# Patient Record
Sex: MALE | Race: WHITE | NOT HISPANIC OR LATINO | Employment: FULL TIME | URBAN - NONMETROPOLITAN AREA
[De-identification: names, ages, dates, MRNs, and addresses within clinical notes are randomized per-mention and may not be internally consistent; named-entity substitution may affect disease eponyms.]

---

## 2017-08-14 ENCOUNTER — COMMUNICATION - GICH (OUTPATIENT)
Dept: FAMILY MEDICINE | Facility: OTHER | Age: 52
End: 2017-08-14

## 2017-08-14 DIAGNOSIS — E03.9 HYPOTHYROIDISM: ICD-10-CM

## 2017-09-15 ENCOUNTER — AMBULATORY - GICH (OUTPATIENT)
Dept: SCHEDULING | Facility: OTHER | Age: 52
End: 2017-09-15

## 2017-10-10 ENCOUNTER — AMBULATORY - GICH (OUTPATIENT)
Dept: FAMILY MEDICINE | Facility: OTHER | Age: 52
End: 2017-10-10

## 2017-10-11 ENCOUNTER — COMMUNICATION - GICH (OUTPATIENT)
Dept: FAMILY MEDICINE | Facility: OTHER | Age: 52
End: 2017-10-11

## 2017-10-11 DIAGNOSIS — E03.9 HYPOTHYROIDISM: ICD-10-CM

## 2017-10-30 ENCOUNTER — OFFICE VISIT - GICH (OUTPATIENT)
Dept: FAMILY MEDICINE | Facility: OTHER | Age: 52
End: 2017-10-30

## 2017-10-30 ENCOUNTER — HISTORY (OUTPATIENT)
Dept: FAMILY MEDICINE | Facility: OTHER | Age: 52
End: 2017-10-30

## 2017-10-30 DIAGNOSIS — Z00.00 ENCOUNTER FOR GENERAL ADULT MEDICAL EXAMINATION WITHOUT ABNORMAL FINDINGS: ICD-10-CM

## 2017-10-30 DIAGNOSIS — E03.9 HYPOTHYROIDISM: ICD-10-CM

## 2017-12-12 ENCOUNTER — AMBULATORY - GICH (OUTPATIENT)
Dept: LAB | Facility: OTHER | Age: 52
End: 2017-12-12

## 2017-12-12 DIAGNOSIS — E03.9 HYPOTHYROIDISM: ICD-10-CM

## 2017-12-12 LAB — TSH - HISTORICAL: 4.44 UIU/ML (ref 0.34–5.6)

## 2017-12-28 NOTE — TELEPHONE ENCOUNTER
Patient Information     Patient Name MRN Sex Toño Diana [id number] Male 1965      Telephone Encounter by Guaadlupe King RN at 10/12/2017  1:35 PM     Author:  Guadalupe King RN Service:  (none) Author Type:  NURS- Registered Nurse     Filed:  10/12/2017  1:36 PM Encounter Date:  10/11/2017 Status:  Signed     :  Guadalupe King RN (NURS- Registered Nurse)            Hypothyroidism    Office visit in the past 12 months or per provider note.    Last visit with ARTHUR ERAZO was on: 10/13/2016 in [facility]  Next visit with ARTHUR ERAZO is on: 10/30/2017 in [facility]  Next visit with [facility] is on: 10/30/2017 in [facility]    Lab testing requirements:  TSH annually  TSH (uIU/mL)    Date Value   2016 1.89       Max refill for 12 months from last office visit or per provider note.    Patient is due for medication management appointment. Limited refill provided at this time. Upcoming appointment noted. Prescription refilled per RN Medication Refill Policy.................... Guadalupe King RN ....................  10/12/2017   1:36 PM

## 2017-12-28 NOTE — PROGRESS NOTES
Patient Information     Patient Name MRN Sex Toño Diana [id number] Male 1965      Progress Notes by Fredrick Villarreal MD at 10/30/2017  9:30 AM     Author:  Fredrick Villarreal MD Service:  (none) Author Type:  Physician     Filed:  10/30/2017 10:17 AM Encounter Date:  10/30/2017 Status:  Signed     :  Fredrick Villarreal MD (Physician)            SUBJECTIVE:    Toño Olson is a 52 y.o. male who presents for his annual physical exam.    HPI: Patient had been experiencing palpitations and slight dyspnea on exertion. Had a negative exercise stress test a year ago and subsequently decreased his caffeine intake. Has noted a significant improvement in symptoms since then. Has a history of hypothyroidism on replacement and his recent TSH through work was high at 6.59. He otherwise has been feeling well.    PROBLEM LIST:  Patient Active Problem List     Diagnosis  Code     Hypothyroidism E03.9     Health care maintenance Z00.00     DDD (degenerative disc disease), lumbar M51.36     Exercise-induced asthma J45.990     PAST MEDICAL HISTORY:  Past Medical History:     Diagnosis  Date     Normal cardiac stress test 2016     Pneumonia     Hospitalized as a child      SURGICAL HISTORY:  Past Surgical History:      Procedure  Laterality Date     COLONOSCOPY DIAGNOSTIC      F/U        TONSILLECTOMY  1969     VASECTOMY         SOCIAL HISTORY:  Social History     Social History        Marital status:       Spouse name: N/A     Number of children:  N/A     Years of education:  N/A     Occupational History      Not on file.     Social History Main Topics         Smoking status:   Never Smoker     Smokeless tobacco:   Never Used     Alcohol use   2.5 oz/week      5 Standard drinks or equivalent per week         Comment: couple beers or wine a week      Drug use:   No     Sexual activity:   Not on file     Other Topics  Concern     Seat Belt Yes     Social History Narrative       "with three sons.   at [employer].             FAMILY HISTORY:  Family History      Problem  Relation Age of Onset     Cancer-breast Mother      Atrial fibrillation Mother      Hyperlipidemia Father      Alcohol/Drug Sister      Alcoholism Sister       CURRENT MEDICATIONS:   Current Outpatient Prescriptions       Medication  Sig Dispense Refill     levothyroxine (SYNTHROID) 125 mcg tablet Take 1 tablet by mouth before breakfast. 90 tablet 0     omega 3-dha-epa-fish oil (FISH OIL) 900-1,400 mg CpDR Take 1 capsule by mouth once daily.  0     No current facility-administered medications for this visit.      Medications have been reviewed by me and are current to the best of my knowledge and ability.    ALLERGIES:  Cats (fur, dander, saliva)    REVIEW OF SYSTEMS:  General: denies any general problems.  Eyes: denies problems  Ears/Nose/Throat: denies problems  Cardiovascular: denies problems  Respiratory: denies problems  Gastrointestinal: denies problems  Genitourinary: denies problems  Musculoskeletal: denies problems  Skin: denies problems  Neurologic: denies problems  Psychiatric: denies problems  Endocrine: denies problems  Heme/Lymphatic: denies problems  Allergic/Immunologic: denies problems  PHQ Depression Screening 10/30/2017   Date of PHQ exam (doc flow) 10/30/2017   1. Lack of interest/pleasure 0 - Not at all   2. Feeling down/depressed 0 - Not at all   PHQ-2 TOTAL SCORE 0   3. Trouble sleeping -   4. Decreased energy -   5. Appetite change -   6. Feelings of failure -   7. Trouble concentrating -   8. Activity level -   9. Hurting yourself -   PHQ-9 TOTAL SCORE -   PHQ-9 Severity Level -   Functional Impairment -   Some recent data might be hidden       OBJECTIVE:  /78  Pulse 72  Ht 1.848 m (6' 0.75\")  Wt 105.4 kg (232 lb 6 oz)  BMI 30.87 kg/m2  EXAM:   General Appearance: Pleasant, alert, appropriate appearance for age. No acute distress  Head Exam: Normal. " Normocephalic, atraumatic.  Eye Exam:  Normal external eye, conjunctiva, lids, cornea. ANDREW.  Ear Exam: Normal TM's bilaterally. Normal auditory canals and external ears. Non-tender.  Nose Exam: Normal external nose, mucus membranes, and septum.  OroPharynx Exam:  Dental hygiene adequate. Normal buccal mucosa. Normal pharynx.  Neck Exam:  Supple, no masses or nodes.  Thyroid Exam: No nodules or enlargement.  Chest/Respiratory Exam: Normal chest wall and respirations. Clear to auscultation.  Cardiovascular Exam: Regular rate and rhythm. S1, S2, no murmur, click, gallop, or rubs.  Gastrointestinal Exam: Soft, non-tender, no masses or organomegaly.  Rectal Exam: Deferred at patient's request.  Genitourinary Exam Male: Normal male genitalia. No discharge or penile ulcerations. No testicular masses or swelling.  Lymphatic Exam: Non-palpable nodes in neck, clavicular, axillary, or inguinal regions.  Musculoskeletal Exam: Back is straight and non-tender, full ROM of upper and lower extremities.  Foot Exam: Left and right foot: good pedal pulses, no lesions, nail hygiene good.  Skin: no rash or abnormalities  Neurologic Exam: Nonfocal, symmetric DTRs, normal gross motor, tone coordination and no tremor.  Psychiatric Exam: Alert and oriented - appropriate affect.    ASSESSMENT/PLAN:    ICD-10-CM    1. Health care maintenance  Blood work from work reviewed with a copy scanned into the EMR. Immunizations are up-to-date.  Z00.00    2. Hypothyroidism, unspecified type  Elected to increase levothyroxin to 125  g daily. He will return in about 6 weeks for a repeat TSH.  E03.9 levothyroxine (SYNTHROID) 125 mcg tablet      TSH       Fredrick Villarreal MD

## 2017-12-30 NOTE — NURSING NOTE
Patient Information     Patient Name MRN Sex Toño Diana [id number] Male 1965      Nursing Note by Iveth Perdomo at 10/30/2017  9:30 AM     Author:  Iveth Perdomo Service:  (none) Author Type:  (none)     Filed:  10/30/2017  9:44 AM Encounter Date:  10/30/2017 Status:  Signed     :  Iveth Perdomo            Patient presents today for his annual exam.  Iveth Perdomo LPN  10/30/2017  9:31 AM

## 2018-01-15 ENCOUNTER — COMMUNICATION - GICH (OUTPATIENT)
Dept: FAMILY MEDICINE | Facility: OTHER | Age: 53
End: 2018-01-15

## 2018-01-15 DIAGNOSIS — E03.9 HYPOTHYROIDISM: ICD-10-CM

## 2018-01-25 VITALS
DIASTOLIC BLOOD PRESSURE: 78 MMHG | WEIGHT: 232.38 LBS | SYSTOLIC BLOOD PRESSURE: 130 MMHG | HEART RATE: 72 BPM | HEIGHT: 73 IN | BODY MASS INDEX: 30.8 KG/M2

## 2018-02-13 ENCOUNTER — DOCUMENTATION ONLY (OUTPATIENT)
Dept: FAMILY MEDICINE | Facility: OTHER | Age: 53
End: 2018-02-13

## 2018-02-13 RX ORDER — LEVOTHYROXINE SODIUM 125 UG/1
1 TABLET ORAL
COMMUNITY
Start: 2017-10-30 | End: 2019-10-08

## 2018-02-13 NOTE — TELEPHONE ENCOUNTER
Patient Information     Patient Name MRN Sex Toño Diana [id number] Male 1965      Telephone Encounter by Sukhdev Austin MD at 2018  8:47 AM     Author:  Sukhdev Austin MD Service:  (none) Author Type:  Physician     Filed:  2018  8:48 AM Encounter Date:  1/15/2018 Status:  Signed     :  Sukhdev Austin MD (Physician)            TSH done in December was normal. Thyroid no should remain the same. Medication can be refilled for a year. If he is feeling well, he doesn't need to be seen again until his physical next fall.  Sukhdev Austin MD ....................  2018   8:48 AM

## 2018-02-13 NOTE — TELEPHONE ENCOUNTER
Patient Information     Patient Name MRN Sex Toño Diana [id number] Male 1965      Telephone Encounter by Radha Alexander RN at 2018  8:49 AM     Author:  Radha Alexander RN  Service:  (none) Author Type:  NURS- Registered Nurse     Filed:  2018  7:47 AM  Encounter Date:  1/15/2018 Status:  Addendum     :  Radha Alexander RN (NURS- Registered Nurse)        Related Notes: Original Note by Radha Alexander RN (NURS- Registered Nurse) filed at 2018  9:40 AM            PLEASE REVIEW, SIGN AND SEND AS APPROPRIATE: THANK YOU.    In clinical absence of patient's primary, Arthur Erazo MD and Petr Banks MD, patient is requesting that this message be sent to the primary provider's Teamlet for consideration please.      In the absence of Arthur Erazo MD, patient was called to recommend that they establish care with one of our other providers. Patient intends to establish care with Petr Banks MD.    PATIENT IS ALSO WONDERING HOW SOON HE WOULD NEED TO BE SEEN BY Petr Banks MD  AS HE WAS SEEN FOR PHYSICAL WITH Arthur Erazo MD ON 10/30/2017. HE WOULD LIKE CALL [phone].    Patient is requesting a limited refill at this time. Please review and sign if appropriate.    This is a Refill request from: [facility]  Name of Medication: levothyroxine (SYNTHROID)  Quantity requested: 90 tablets  Last fill date: 10/30/2017 (#90, R-0)  Due for refill: Yes (Patient states he has some on hand)  Last visit with ARTHUR ERAZO was on: 10/30/2017 in [facility]  PCP:  Arthur Erazo MD  Controlled Substance Agreement: N/a  Diagnosis r/t this medication request: Hypothyroidism     Unable to complete prescription refill due to permanent absence of prescribing provider.................... Radha Alexander RN ....................  2018   9:35 AM

## 2018-02-13 NOTE — TELEPHONE ENCOUNTER
Patient Information     Patient Name MRN Sex Toño Diana [id number] Male 1965      Telephone Encounter by Radha Alexander RN at 2018  8:57 AM     Author:  Radha Alexander RN Service:  (none) Author Type:  NURS- Registered Nurse     Filed:  2018  8:59 AM Encounter Date:  1/15/2018 Status:  Signed     :  Radha Alexander RN (NURS- Registered Nurse)            Called Patient and verified last name and . Patient notified and very happy with this information.    Radha Alexander RN .............. 2018  8:59 AM

## 2018-09-13 ENCOUNTER — TRANSFERRED RECORDS (OUTPATIENT)
Dept: HEALTH INFORMATION MANAGEMENT | Facility: OTHER | Age: 53
End: 2018-09-13

## 2018-09-21 ENCOUNTER — OFFICE VISIT (OUTPATIENT)
Dept: FAMILY MEDICINE | Facility: OTHER | Age: 53
End: 2018-09-21
Attending: FAMILY MEDICINE
Payer: COMMERCIAL

## 2018-09-21 VITALS
HEART RATE: 62 BPM | DIASTOLIC BLOOD PRESSURE: 72 MMHG | BODY MASS INDEX: 32.18 KG/M2 | HEIGHT: 73 IN | SYSTOLIC BLOOD PRESSURE: 128 MMHG | RESPIRATION RATE: 16 BRPM | WEIGHT: 242.8 LBS

## 2018-09-21 DIAGNOSIS — E03.8 OTHER SPECIFIED HYPOTHYROIDISM: ICD-10-CM

## 2018-09-21 DIAGNOSIS — Z00.00 ROUTINE GENERAL MEDICAL EXAMINATION AT A HEALTH CARE FACILITY: Primary | ICD-10-CM

## 2018-09-21 PROCEDURE — 99396 PREV VISIT EST AGE 40-64: CPT | Performed by: FAMILY MEDICINE

## 2018-09-21 RX ORDER — LEVOTHYROXINE SODIUM 137 UG/1
137 TABLET ORAL DAILY
Qty: 90 TABLET | Refills: 3 | Status: SHIPPED | OUTPATIENT
Start: 2018-09-21 | End: 2019-09-29

## 2018-09-21 ASSESSMENT — ANXIETY QUESTIONNAIRES
1. FEELING NERVOUS, ANXIOUS, OR ON EDGE: NOT AT ALL
3. WORRYING TOO MUCH ABOUT DIFFERENT THINGS: NOT AT ALL
2. NOT BEING ABLE TO STOP OR CONTROL WORRYING: NOT AT ALL
IF YOU CHECKED OFF ANY PROBLEMS ON THIS QUESTIONNAIRE, HOW DIFFICULT HAVE THESE PROBLEMS MADE IT FOR YOU TO DO YOUR WORK, TAKE CARE OF THINGS AT HOME, OR GET ALONG WITH OTHER PEOPLE: NOT DIFFICULT AT ALL
5. BEING SO RESTLESS THAT IT IS HARD TO SIT STILL: NOT AT ALL
GAD7 TOTAL SCORE: 0
7. FEELING AFRAID AS IF SOMETHING AWFUL MIGHT HAPPEN: NOT AT ALL
6. BECOMING EASILY ANNOYED OR IRRITABLE: NOT AT ALL

## 2018-09-21 ASSESSMENT — PATIENT HEALTH QUESTIONNAIRE - PHQ9: 5. POOR APPETITE OR OVEREATING: NOT AT ALL

## 2018-09-21 ASSESSMENT — PAIN SCALES - GENERAL: PAINLEVEL: NO PAIN (0)

## 2018-09-21 NOTE — MR AVS SNAPSHOT
After Visit Summary   9/21/2018    Toño Olson    MRN: [id number]           Patient Information     Date Of Birth          1965        Visit Information        Provider Department      9/21/2018 8:15 AM Petr Banks MD [facility]        Today's Diagnoses     Routine general medical examination at a health care facility    -  1    Other specified hypothyroidism          Care Instructions      Preventive Health Recommendations  Male Ages 50 - 64    Yearly exam:             See your health care provider every year in order to  o   Review health changes.   o   Discuss preventive care.    o   Review your medicines if your doctor has prescribed any.     Have a cholesterol test every 5 years, or more frequently if you are at risk for high cholesterol/heart disease.     Have a diabetes test (fasting glucose) every three years. If you are at risk for diabetes, you should have this test more often.     Have a colonoscopy at age 50, or have a yearly FIT test (stool test). These exams will check for colon cancer.      Talk with your health care provider about whether or not a prostate cancer screening test (PSA) is right for you.    You should be tested each year for STDs (sexually transmitted diseases), if you re at risk.     Shots: Get a flu shot each year. Get a tetanus shot every 10 years.     Nutrition:    Eat at least 5 servings of fruits and vegetables daily.     Eat whole-grain bread, whole-wheat pasta and brown rice instead of white grains and rice.     Get adequate Calcium and Vitamin D.     Lifestyle    Exercise for at least 150 minutes a week (30 minutes a day, 5 days a week). This will help you control your weight and prevent disease.     Limit alcohol to one drink per day.     No smoking.     Wear sunscreen to prevent skin cancer.     See your dentist every six months for an exam and cleaning.     See your eye doctor every 1 to 2 years.            Follow-ups after  "your visit        Who to contact     If you have questions or need follow up information about today's clinic visit or your schedule please contact [facility] directly at [phone].  Normal or non-critical lab and imaging results will be communicated to you by MyChart, letter or phone within 4 business days after the clinic has received the results. If you do not hear from us within 7 days, please contact the clinic through MyChart or phone. If you have a critical or abnormal lab result, we will notify you by phone as soon as possible.  Submit refill requests through [employer] or call your pharmacy and they will forward the refill request to us. Please allow 3 business days for your refill to be completed.          Additional Information About Your Visit        [employer] Information     [employer] lets you send messages to your doctor, view your test results, renew your prescriptions, schedule appointments and more. To sign up, go to [email or web address] . Click on \"Log in\" on the left side of the screen, which will take you to the Welcome page. Then click on \"Sign up Now\" on the right side of the page.     You will be asked to enter the access code listed below, as well as some personal information. Please follow the directions to create your username and password.     Your access code is: [email or web address]  Expires: 2018  8:50 AM     Your access code will  in 90 days. If you need help or a new code, please call your [facility] clinic or [phone].        Care EveryWhere ID     This is your Care EveryWhere ID. This could be used by other organizations to access your [facility] medical records  [id number]        Your Vitals Were     Pulse Respirations Height BMI (Body Mass Index)          62 16 6' 1\" (1.854 m) 32.03 kg/m2         Blood Pressure from Last 3 Encounters:   18 128/72   10/30/17 130/78   10/13/16 114/78    Weight from Last 3 Encounters:   18 242 lb 12.8 oz (110.1 " kg)   10/30/17 232 lb 6 oz (105.4 kg)   10/13/16 235 lb (106.6 kg)              Today, you had the following     No orders found for display         Today's Medication Changes          These changes are accurate as of 9/21/18  8:50 AM.  If you have any questions, ask your nurse or doctor.               These medicines have changed or have updated prescriptions.        Dose/Directions    * levothyroxine 125 MCG tablet   Commonly known as:  SYNTHROID/LEVOTHROID   This may have changed:  Another medication with the same name was added. Make sure you understand how and when to take each.   Changed by:  Petr Banks MD        Dose:  1 tablet   Take 1 tablet by mouth every morning (before breakfast)   Refills:  0       * levothyroxine 137 MCG tablet   Commonly known as:  SYNTHROID/LEVOTHROID   This may have changed:  You were already taking a medication with the same name, and this prescription was added. Make sure you understand how and when to take each.   Used for:  Other specified hypothyroidism   Changed by:  Petr Banks MD        Dose:  137 mcg   Take 1 tablet (137 mcg) by mouth daily   Quantity:  90 tablet   Refills:  3       * Notice:  This list has 2 medication(s) that are the same as other medications prescribed for you. Read the directions carefully, and ask your doctor or other care provider to review them with you.         Where to get your medicines      These medications were sent to [id number] - [place], [place] - [address]  [address], [facility] [address]     Phone:  [phone]     levothyroxine 137 MCG tablet                Primary Care Provider Office Phone # Fax #    Petr Banks [phone] [address]  [facility] [address]        Equal Access to Services     [facility] ZAIDA AH: Marcia Martinez, tessy alexandra, janessa hwang. So [facility]  [phone].    ATENCIÓN: Si amadeo brar, tiene a smith disposición servicios gratuitos de asistencia lingüística. Jorge davila [phone].    We comply with applicable federal civil rights laws and [place] laws. We do not discriminate on the basis of race, color, national origin, age, disability, sex, sexual orientation, or gender identity.            Thank you!     Thank you for choosing [facility]  for your care. Our goal is always to provide you with excellent care. Hearing back from our patients is one way we can continue to improve our services. Please take a few minutes to complete the written survey that you may receive in the mail after your visit with us. Thank you!             Your Updated Medication List - Protect others around you: Learn how to safely use, store and throw away your medicines at www.disposemymeds.org.          This list is accurate as of 9/21/18  8:50 AM.  Always use your most recent med list.                   Brand Name Dispense Instructions for use Diagnosis    DOCOSAHEXAENOIC ACID PO      Take 1 capsule by mouth daily        * levothyroxine 125 MCG tablet    SYNTHROID/LEVOTHROID     Take 1 tablet by mouth every morning (before breakfast)        * levothyroxine 137 MCG tablet    SYNTHROID/LEVOTHROID    90 tablet    Take 1 tablet (137 mcg) by mouth daily    Other specified hypothyroidism       * Notice:  This list has 2 medication(s) that are the same as other medications prescribed for you. Read the directions carefully, and ask your doctor or other care provider to review them with you.

## 2018-09-21 NOTE — PROGRESS NOTES
SUBJECTIVE:   CC: Toño Olson is an 53 year old male who presents for preventative health visit.     Healthy Habits:    Do you get at least three servings of calcium containing foods daily (dairy, green leafy vegetables, etc.)? yes    Amount of exercise or daily activities, outside of work: 5 day(s) per week    Problems taking medications regularly No    Medication side effects: No    Have you had an eye exam in the past two years? yes    Do you see a dentist twice per year? yes    Do you have sleep apnea, excessive snoring or daytime drowsiness?no       Had labs done at work.  His LDL was high at 169.  Also, TSH was elevated at 7.11.  He wants to increase his dose of synthroid.    Has had 2 stress tests for chest pain and shortness of breath, but has always passed.  Has some cold and exercise asthma.  Allergic rhinitis.        Today's PHQ-2 Score:   PHQ-2 ( 1999 Pfizer) 9/21/2018   Q1: Little interest or pleasure in doing things 0   Q2: Feeling down, depressed or hopeless 0   PHQ-2 Score 0       Abuse: Current or Past(Physical, Sexual or Emotional)-    Do you feel safe in your environment -      Social History   Substance Use Topics     Smoking status: Never Smoker     Smokeless tobacco: Never Used     Alcohol use 2.5 oz/week      Comment: Alcoholic Drinks/day: couple beers or wine a week      If you drink alcohol do you typically have >3 drinks per day or >7 drinks per week? No                      Last PSA: No results found for: PSA    Reviewed orders with patient. Reviewed health maintenance and updated orders accordingly - Yes  Labs reviewed in EPIC    Reviewed and updated as needed this visit by clinical staff  Tobacco  Allergies  Meds  Med Hx  Surg Hx  Fam Hx  Soc Hx        Reviewed and updated as needed this visit by Provider        Past Medical History:   Diagnosis Date     Encounter for screening for cardiovascular disorders     12/2016     Pneumonia     Hospitalized as a child      Past  "Surgical History:   Procedure Laterality Date     COLONOSCOPY      2014,F/U 2024     TONSILLECTOMY      1969     VASECTOMY      1998       ROS:  CONSTITUTIONAL: NEGATIVE for fever, chills, change in weight  INTEGUMENTARY/SKIN: NEGATIVE for worrisome rashes, moles or lesions  EYES: NEGATIVE for vision changes or irritation  ENT: NEGATIVE for ear, mouth and throat problems  RESP: NEGATIVE for significant cough or SOB  CV: NEGATIVE for chest pain, palpitations or peripheral edema  GI: NEGATIVE for nausea, abdominal pain, heartburn, or change in bowel habits   male: negative for dysuria, hematuria, decreased urinary stream, erectile dysfunction, urethral discharge  MUSCULOSKELETAL: NEGATIVE for significant arthralgias or myalgia  NEURO: NEGATIVE for weakness, dizziness or paresthesias  PSYCHIATRIC: NEGATIVE for changes in mood or affect    OBJECTIVE:   /72 (BP Location: Right arm, Patient Position: Sitting, Cuff Size: Adult Large)  Pulse 62  Resp 16  Ht 6' 1\" (1.854 m)  Wt 242 lb 12.8 oz (110.1 kg)  BMI 32.03 kg/m2  EXAM:  GENERAL: healthy, alert and no distress  EYES: Eyes grossly normal to inspection, PERRL and conjunctivae and sclerae normal  HENT: ear canals and TM's normal, nose and mouth without ulcers or lesions  NECK: no adenopathy, no asymmetry, masses, or scars and thyroid normal to palpation  RESP: lungs clear to auscultation - no rales, rhonchi or wheezes  CV: regular rate and rhythm, normal S1 S2, no S3 or S4, no murmur, click or rub, no peripheral edema and peripheral pulses strong  ABDOMEN: soft, nontender, no hepatosplenomegaly, no masses and bowel sounds normal  MS: no gross musculoskeletal defects noted, no edema  SKIN: no suspicious lesions or rashes  NEURO: Normal strength and tone, mentation intact and speech normal  PSYCH: mentation appears normal, affect normal/bright    Diagnostic Test Results:  none     ASSESSMENT/PLAN:   (Z00.00) Routine general medical examination at a [facility] " "facility  (primary encounter diagnosis)  Comment: generally healthy  Plan: CANCELED: GLUCOSE, CANCELED: Lipid Profile        [facility].    (E03.8) Other specified hypothyroidism  Comment: slightly worse  Plan: levothyroxine (SYNTHROID/LEVOTHROID) 137 MCG         tablet        Follow up TSh in 6-8 weeks.      COUNSELING:  Reviewed preventive health counseling, as reflected in patient instructions       Regular exercise       Healthy diet/nutrition       Colon cancer screening       Prostate cancer screening    BP Readings from Last 1 Encounters:   09/21/18 128/72     Estimated body mass index is 32.03 kg/(m^2) as calculated from the following:    Height as of this encounter: 6' 1\" (1.854 m).    Weight as of this encounter: 242 lb 12.8 oz (110.1 kg).      Weight management plan: Discussed healthy diet and exercise guidelines and patient will follow up in 12 months in clinic to re-evaluate.     reports that he has never smoked. He has never used smokeless tobacco.      Counseling Resources:  ATP IV Guidelines  Pooled Cohorts Equation Calculator  FRAX Risk Assessment  ICSI Preventive Guidelines  Dietary Guidelines for Americans, 2010  USDA's MyPlate  ASA Prophylaxis  Lung CA Screening    Petr Banks MD  [facility]  "

## 2018-09-22 ASSESSMENT — ANXIETY QUESTIONNAIRES: GAD7 TOTAL SCORE: 0

## 2018-12-12 ENCOUNTER — TELEPHONE (OUTPATIENT)
Dept: FAMILY MEDICINE | Facility: OTHER | Age: 53
End: 2018-12-12

## 2018-12-12 DIAGNOSIS — E03.9 HYPOTHYROIDISM, UNSPECIFIED TYPE: Primary | ICD-10-CM

## 2018-12-12 NOTE — TELEPHONE ENCOUNTER
Patient calling in regards to needing to get his TSH recheck after dose change at patient's last office visit. He is aware that Dr. Banks is out of the office and that he will return tomorrow morning. Patient was fine with this.    Kimberly Matute LPN...................12/12/2018  1:36 PM

## 2018-12-13 DIAGNOSIS — E03.9 HYPOTHYROIDISM, UNSPECIFIED TYPE: ICD-10-CM

## 2018-12-13 LAB — TSH SERPL DL<=0.05 MIU/L-ACNC: 0.96 IU/ML (ref 0.34–5.6)

## 2018-12-13 PROCEDURE — 84443 ASSAY THYROID STIM HORMONE: CPT | Performed by: FAMILY MEDICINE

## 2018-12-13 PROCEDURE — 36415 COLL VENOUS BLD VENIPUNCTURE: CPT | Performed by: FAMILY MEDICINE

## 2018-12-13 NOTE — TELEPHONE ENCOUNTER
Attempted to reach patient but patient's wife answered and after giving last name and date of birth, results were given.  Rickey Vernon LPN ..............12/13/2018 8:32 AM

## 2019-09-16 ENCOUNTER — TRANSFERRED RECORDS (OUTPATIENT)
Dept: FAMILY MEDICINE | Facility: OTHER | Age: 54
End: 2019-09-16

## 2019-09-16 LAB
ALT SERPL-CCNC: 11 U/L (ref 9–46)
AST SERPL-CCNC: 16 U/L (ref 10–35)
CHOLEST SERPL-MCNC: 216 MG/DL (ref 125–199)
CREAT SERPL-MCNC: 1 MG/DL (ref 0.7–1.33)
GFR SERPL CREATININE-BSD FRML MDRD: 84 ML/MIN/1.73M2
GLUCOSE SERPL-MCNC: 89 MG/DL (ref 65–99)
HBA1C MFR BLD: 5 % (ref 4–5.6)
HDLC SERPL-MCNC: 45 MG/DL
LDLC SERPL CALC-MCNC: 145 MG/DL
NONHDLC SERPL-MCNC: 171 MG/DL
TRIGL SERPL-MCNC: 137 MG/DL
TSH SERPL-ACNC: 1.34 MIU/L (ref 0.4–4.5)

## 2019-09-29 DIAGNOSIS — E03.8 OTHER SPECIFIED HYPOTHYROIDISM: ICD-10-CM

## 2019-10-01 RX ORDER — LEVOTHYROXINE SODIUM 137 UG/1
TABLET ORAL
Qty: 90 TABLET | Refills: 0 | Status: SHIPPED | OUTPATIENT
Start: 2019-10-01 | End: 2019-10-08

## 2019-10-01 NOTE — TELEPHONE ENCOUNTER
"Requested Prescriptions   Pending Prescriptions Disp Refills     levothyroxine (SYNTHROID/LEVOTHROID) 137 MCG tablet [Pharmacy Med Name: LEVOTHYROXINE 137MCG TABLET] 90 tablet 3     Sig: TAKE 1 TABLET BY MOUTH DAILY       Thyroid Protocol Passed - 9/29/2019  9:01 AM        Passed - Patient is 12 years or older        Passed - Recent (12 mo) or future (30 days) visit within the authorizing provider's specialty     Patient has had an office visit with the authorizing provider or a provider within the authorizing providers department within the previous 12 mos or has a future within next 30 days. See \"Patient Info\" tab in inbasket, or \"Choose Columns\" in Meds & Orders section of the refill encounter.              Passed - Medication is active on med list        Passed - Normal TSH on file in past 12 months     No lab results found.           lov 09/21/18 appt scheduled for 10/08/19  Prescription [place] per [facility] [place].    "

## 2019-10-08 ENCOUNTER — OFFICE VISIT (OUTPATIENT)
Dept: FAMILY MEDICINE | Facility: OTHER | Age: 54
End: 2019-10-08
Attending: FAMILY MEDICINE
Payer: COMMERCIAL

## 2019-10-08 VITALS
HEIGHT: 73 IN | RESPIRATION RATE: 16 BRPM | WEIGHT: 236.4 LBS | SYSTOLIC BLOOD PRESSURE: 126 MMHG | TEMPERATURE: 98.1 F | DIASTOLIC BLOOD PRESSURE: 66 MMHG | BODY MASS INDEX: 31.33 KG/M2 | OXYGEN SATURATION: 98 % | HEART RATE: 86 BPM

## 2019-10-08 DIAGNOSIS — Z00.00 ROUTINE GENERAL MEDICAL EXAMINATION AT A HEALTH CARE FACILITY: Primary | ICD-10-CM

## 2019-10-08 DIAGNOSIS — E03.8 OTHER SPECIFIED HYPOTHYROIDISM: ICD-10-CM

## 2019-10-08 DIAGNOSIS — R40.0 DAYTIME SOMNOLENCE: ICD-10-CM

## 2019-10-08 PROCEDURE — 99396 PREV VISIT EST AGE 40-64: CPT | Performed by: FAMILY MEDICINE

## 2019-10-08 RX ORDER — LEVOTHYROXINE SODIUM 137 UG/1
137 TABLET ORAL DAILY
Qty: 90 TABLET | Refills: 3 | Status: SHIPPED | OUTPATIENT
Start: 2019-10-08 | End: 2020-10-19

## 2019-10-08 ASSESSMENT — ASTHMA QUESTIONNAIRES
QUESTION_4 LAST FOUR WEEKS HOW OFTEN HAVE YOU USED YOUR RESCUE INHALER OR NEBULIZER MEDICATION (SUCH AS ALBUTEROL): NOT AT ALL
QUESTION_1 LAST FOUR WEEKS HOW MUCH OF THE TIME DID YOUR ASTHMA KEEP YOU FROM GETTING AS MUCH DONE AT WORK, SCHOOL OR AT HOME: NONE OF THE TIME
QUESTION_5 LAST FOUR WEEKS HOW WOULD YOU RATE YOUR ASTHMA CONTROL: WELL CONTROLLED
QUESTION_2 LAST FOUR WEEKS HOW OFTEN HAVE YOU HAD SHORTNESS OF BREATH: NOT AT ALL
ACUTE_EXACERBATION_TODAY: NO
QUESTION_3 LAST FOUR WEEKS HOW OFTEN DID YOUR ASTHMA SYMPTOMS (WHEEZING, COUGHING, SHORTNESS OF BREATH, CHEST TIGHTNESS OR PAIN) WAKE YOU UP AT NIGHT OR EARLIER THAN USUAL IN THE MORNING: NOT AT ALL
ACT_TOTALSCORE: 24

## 2019-10-08 ASSESSMENT — MIFFLIN-ST. JEOR: SCORE: 1961.18

## 2019-10-08 ASSESSMENT — PAIN SCALES - GENERAL: PAINLEVEL: NO PAIN (0)

## 2019-10-08 NOTE — NURSING NOTE
"Coming in for a physical check up    Chief Complaint   Patient presents with     Physical      along with slepping okay but still feeling tired       Initial /66 (BP Location: Right arm, Patient Position: Sitting, Cuff Size: Adult Regular)   Pulse 86   Temp 98.1  F (36.7  C) (Tympanic)   Resp 16   Ht 1.854 m (6' 1\")   Wt 107.2 kg (236 lb 6.4 oz)   SpO2 98%   BMI 31.19 kg/m   Estimated body mass index is 31.19 kg/m  as calculated from the following:    Height as of this encounter: 1.854 m (6' 1\").    Weight as of this encounter: 107.2 kg (236 lb 6.4 oz).  Medication Reconciliation: complete    Ailyn Mcknight LPN  "

## 2019-10-08 NOTE — PROGRESS NOTES
SUBJECTIVE:   CC: Toño Olson is an 54 year old male who presents for preventive health visit.     Healthy Habits:    Do you get at least three servings of calcium containing foods daily (dairy, green leafy vegetables, etc.)? yes    Amount of exercise or daily activities, outside of work: 3-4 day(s) per week    Problems taking medications regularly No    Medication side effects: No    Have you had an eye exam in the past two years? yes    Do you see a dentist twice per year? yes    Do you have sleep apnea, excessive snoring or daytime drowsiness?yes, daytime drowsiness     Is having daytime drowsiness, consistently and is worse as the day goes on. Does not note any snoring but sleeps on his stomach. This has been going on for a year. Has no trouble falling asleep or staying sleep.     Today's PHQ-2 Score:   PHQ-2 ( 1999 Pfizer) 9/21/2018   Q1: Little interest or pleasure in doing things 0   Q2: Feeling down, depressed or hopeless 0   PHQ-2 Score 0       Abuse: Current or Past(Physical, Sexual or Emotional)- No  Do you feel safe in your environment? YES    Social History     Tobacco Use     Smoking status: Never Smoker     Smokeless tobacco: Never Used   Substance Use Topics     Alcohol use: Yes     Alcohol/week: 4.2 standard drinks     Comment: Alcoholic Drinks/day: couple beers or wine a week     If you drink alcohol do you typically have >3 drinks per day or >7 drinks per week? No                      Last PSA: No results found for: PSA    Reviewed orders with patient. Reviewed health maintenance and updated orders accordingly - Yes      Reviewed and updated as needed this visit by clinical staff  Tobacco  Allergies  Meds  Med Hx  Soc Hx        Reviewed and updated as needed this visit by Provider        Past Medical History:   Diagnosis Date     Encounter for screening for cardiovascular disorders     12/2016     Pneumonia     Hospitalized as a child      Past Surgical History:   Procedure Laterality  "Date     COLONOSCOPY  09/05/2014    Hyperplastic polyps,  F/U 2024     TONSILLECTOMY      1969     VASECTOMY      1998       ROS:      OBJECTIVE:   /66 (BP Location: Right arm, Patient Position: Sitting, Cuff Size: Adult Regular)   Pulse 86   Temp 98.1  F (36.7  C) (Tympanic)   Resp 16   Ht 1.854 m (6' 1\")   Wt 107.2 kg (236 lb 6.4 oz)   SpO2 98%   BMI 31.19 kg/m    EXAM:  GENERAL: healthy, alert and no distress  EYES: Eyes grossly normal to inspection, PERRL and conjunctivae and sclerae normal  HENT: ear canals and TM's normal, nose and mouth without ulcers or lesions  NECK: no adenopathy, no asymmetry, masses, or scars and thyroid normal to palpation  RESP: lungs clear to auscultation - no rales, rhonchi or wheezes  CV: regular rate and rhythm, normal S1 S2, no S3 or S4, no murmur, click or rub, no peripheral edema and peripheral pulses strong  ABDOMEN: soft, nontender, no hepatosplenomegaly, no masses and bowel sounds normal  MS: no gross musculoskeletal defects noted, no edema  SKIN: no suspicious lesions or rashes  NEURO: Normal strength and tone, mentation intact and speech normal  PSYCH: mentation appears normal, affect normal/bright    Diagnostic Test Results:  Labs reviewed in Epic  none     ASSESSMENT/PLAN:   1. Routine general medical examination at a health care facility      2. Other specified hypothyroidism  Stable, TSH stable   - levothyroxine (SYNTHROID/LEVOTHROID) 137 MCG tablet; Take 1 tablet (137 mcg) by mouth daily  Dispense: 90 tablet; Refill: 3    3. Daytime somnolence  Patient has had daytime somnolence for a year. It occurs every day and starts in the morning. He has no trouble falling asleep or staying asleep. He notes no change in mood, or feeling depressed. His iron levels are within normal limits, and his TSH is also normal. He reports no fever, chills or night sweats, making infection less likely. He sleeps on his stomach every night and he has not been told he snores. He " "has no report of SOB when sleeping. Plan is to see a sleep specialist to evaluate if sleep apnea is likely the cause.   - SLEEP EVALUATION & MANAGEMENT REFERRAL - ADULT -[facility] [phone] (Age 13 and up); Future    COUNSELING:  Reviewed preventive health counseling, as reflected in patient instructions       Healthy diet/nutrition       Immunizations    Declined: Influenza due to Other getting vaccine through work.             Estimated body mass index is 31.19 kg/m  as calculated from the following:    Height as of this encounter: 1.854 m (6' 1\").    Weight as of this encounter: 107.2 kg (236 lb 6.4 oz).         reports that he has never smoked. He has never used smokeless tobacco.      Counseling Resources:  ATP IV Guidelines  Pooled Cohorts Equation Calculator  FRAX Risk Assessment  ICSI Preventive Guidelines  Dietary Guidelines for Americans, 2010  USDA's MyPlate  ASA Prophylaxis  Lung CA Screening    Bette Hurtado, MS3, RPAP student   Working with the supervision of MD Petr Shelby MD  [facility] AND [facility] [place]  "

## 2019-10-09 ASSESSMENT — ASTHMA QUESTIONNAIRES: ACT_TOTALSCORE: 24

## 2020-02-10 ENCOUNTER — OFFICE VISIT (OUTPATIENT)
Dept: PULMONOLOGY | Facility: OTHER | Age: 55
End: 2020-02-10
Attending: INTERNAL MEDICINE
Payer: COMMERCIAL

## 2020-02-10 VITALS
BODY MASS INDEX: 32.39 KG/M2 | HEIGHT: 73 IN | OXYGEN SATURATION: 98 % | TEMPERATURE: 96.3 F | SYSTOLIC BLOOD PRESSURE: 112 MMHG | HEART RATE: 76 BPM | DIASTOLIC BLOOD PRESSURE: 72 MMHG | WEIGHT: 244.4 LBS | RESPIRATION RATE: 16 BRPM

## 2020-02-10 DIAGNOSIS — G47.10 HYPERSOMNIA: ICD-10-CM

## 2020-02-10 DIAGNOSIS — R06.83 SNORING: Primary | ICD-10-CM

## 2020-02-10 PROCEDURE — G0463 HOSPITAL OUTPT CLINIC VISIT: HCPCS

## 2020-02-10 ASSESSMENT — MIFFLIN-ST. JEOR: SCORE: 1997.47

## 2020-02-10 ASSESSMENT — PAIN SCALES - GENERAL: PAINLEVEL: NO PAIN (0)

## 2020-02-10 NOTE — PROGRESS NOTES
Sleep Medicine Note  Toño Olson  February 10, 2020  [id number]    Chief Complaint: He says that he is tired of feeling tired    History of Present Illness: Toño Olson is a 54 year old male presenting for above complaint.  Has a history of tiredness which has been getting worse the past 7 or 8 years.  It has come on gradually as he is gained more and more weight.  He is working on dietary changes, increasing exercise and weight reduction.  His complaint is that he cannot stay asleep.  He awakens every 2-3 hours.  He goes to bed around 10-11 PM and gets up before 6 AM.  He is felecia if he can sleep 6 hours.  He does not have any nocturia.  He occasionally snores.  If he is overtired he snores.  If he has a few drinks he snores.  He does not awaken himself gasping.  He does not have any witnessed apneas.  He does not recall any dreaming.  He is familiar with sleep apnea as his father was recently diagnosed with sleep apnea and his father is much improved with CPAP.  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  When he wore his Fitbit a few weeks ago it described poor sleep.  He denies any significant restless limb symptoms.  He says he mostly sleeps on his stomach.  He falls asleep quickly within 5 minutes.  During the daytime he scores 9 on the [place] Sleepiness Scale but his tiredness he says is much worse than that.  Is impacting his daytime performance and quality of life.  It is not causing him to fall asleep at work or with driving.  He does not have any hypnagogic  hallucinations, no cataplexy, no sleep paralysis.  No history of traumatic brain injury or head injury.      Past Medical History:  Past Medical History:   Diagnosis Date     Encounter for screening for cardiovascular disorders     12/2016     Pneumonia     Hospitalized as a child       Medications:  Current Outpatient Medications   Medication     levothyroxine (SYNTHROID/LEVOTHROID) 137 MCG tablet  "    No current facility-administered medications for this visit.        Physical Exam:  /72 (BP Location: Right arm, Patient Position: Sitting, Cuff Size: Adult Regular)   Pulse 76   Temp 96.3  F (35.7  C) (Tympanic)   Resp 16   Ht 6' 1\" (1.854 m)   Wt 244 lb 6.4 oz (110.9 kg)   SpO2 98%   BMI 32.24 kg/m    Neck circumference 42 cm, pharynx is without erythema, dentition is intact, posterior crowding is noted, Malampati class IV.  No significant tonsillar tissue.  Lungs are clear no wheeze, rhonchi, crackles, or focal dullness.  Cardiovascular exam S1-S2, regular rhythm and rate, no murmur, rub, or gallop.  Extremities are without cyanosis clubbing or edema.  Speech is fluent and appropriate.    Assessment and Plan:  54 year old male presenting for as of daytime tiredness.  Despite only scoring 9 on the [place] Sleepiness Scale he says he is tired of feeling tired.  His biggest complaint is frequent awakenings which could be caused by REM related sleep apnea.  He does snore.  He has a body habitus making sleep apnea likely.  The diagnosis, pathophysiology, and treatment of obstructive sleep apnea was discussed with him.  Without clear-cut sleep apnea and other potential causes for awakening such as periodic limb movements of sleep he would not be a good candidate for home sleep apnea test.  I recommended a split-night sleep study and I will contact him with the test results afterward.  We did discuss the benefits of dietary changes and weight reduction.    C: [employer]      "

## 2020-02-10 NOTE — NURSING NOTE
"Patient presents to the clinic today for a consult.  Deepti Krishna LPN 2/10/2020   8:58 AM    Chief Complaint   Patient presents with     Consult       Initial /72 (BP Location: Right arm, Patient Position: Sitting, Cuff Size: Adult Regular)   Pulse 76   Temp 96.3  F (35.7  C) (Tympanic)   Resp 16   Ht 6' 1\" (1.854 m)   Wt 244 lb 6.4 oz (110.9 kg)   SpO2 98%   BMI 32.24 kg/m   Estimated body mass index is 32.24 kg/m  as calculated from the following:    Height as of this encounter: 6' 1\" (1.854 m).    Weight as of this encounter: 244 lb 6.4 oz (110.9 kg).  Medication Reconciliation: complete  Deepti Krishna LPN    "

## 2020-03-02 ENCOUNTER — THERAPY VISIT (OUTPATIENT)
Dept: SLEEP MEDICINE | Facility: OTHER | Age: 55
End: 2020-03-02
Attending: INTERNAL MEDICINE
Payer: COMMERCIAL

## 2020-03-02 DIAGNOSIS — G47.19 EXCESSIVE DAYTIME SLEEPINESS: Primary | ICD-10-CM

## 2020-03-02 PROCEDURE — 95811 POLYSOM 6/>YRS CPAP 4/> PARM: CPT | Mod: TC

## 2020-03-11 ENCOUNTER — HEALTH MAINTENANCE LETTER (OUTPATIENT)
Age: 55
End: 2020-03-11

## 2020-10-08 ENCOUNTER — ALLIED HEALTH/NURSE VISIT (OUTPATIENT)
Dept: FAMILY MEDICINE | Facility: OTHER | Age: 55
End: 2020-10-08
Attending: FAMILY MEDICINE
Payer: COMMERCIAL

## 2020-10-08 DIAGNOSIS — Z23 NEED FOR PROPHYLACTIC VACCINATION AND INOCULATION AGAINST INFLUENZA: Primary | ICD-10-CM

## 2020-10-08 PROCEDURE — 90471 IMMUNIZATION ADMIN: CPT

## 2020-10-08 PROCEDURE — 90686 IIV4 VACC NO PRSV 0.5 ML IM: CPT

## 2020-10-08 NOTE — PROGRESS NOTES
Immunization: Adult  Verified patient's name and . Stated reason for visit today is to receive Influenza vaccine(s). Denied any concerns with previous immunizations. Allergies reviewed.  Screening Questionnaire Adult Immunization completed (see below). VIS handout(s) reviewed and given to take home. Influenza prepared and administered IM per standing order. Administration documented in IMMUNIZATIONS (see MIIC and order for further information). Instructed to wait in lobby for 15 minutes post-injection and notify RN immediately of any adverse reaction.     Screening Questionnaire for Adult Immunization    Are you sick today?   No   Do you have allergies to vaccines or vaccine components?   No   Have you ever had a serious reaction after receiving a vaccination?   No   Have you received any vaccinations in the past 4 weeks?   No     Immunization questionnaire answers were all negative.    Santiago Roque RN, BSN  ....................  10/8/2020   3:43 PM

## 2020-10-19 DIAGNOSIS — E03.8 OTHER SPECIFIED HYPOTHYROIDISM: ICD-10-CM

## 2020-10-19 RX ORDER — LEVOTHYROXINE SODIUM 137 UG/1
137 TABLET ORAL DAILY
Qty: 90 TABLET | Refills: 0 | Status: SHIPPED | OUTPATIENT
Start: 2020-10-19 | End: 2020-11-12

## 2020-10-19 NOTE — TELEPHONE ENCOUNTER
[id number] sent Rx request for the following:   levothyroxine (SYNTHROID/LEVOTHROID) 137 MCG tablet   Sig:TAKE 1 TABLET (137 MCG) BY MOUTH DAILY    Last Prescription Date:   10/08/2019  Last Fill Qty/Refills:         90, R-3    Last Office Visit:              10/18/2019   Future Office visit:           None noted    Patient due for annual with PCP. Letter sent via [employer]. Will approved 90 day limited supply.  Prescription approved per [facility] Refill Protocol.  Genny Hardin RN ....................  10/19/2020   4:44 PM

## 2020-10-19 NOTE — LETTER
October 19, 2020      Toño Olson  [id number] [facility] [address]        Dear Toño,       A refill of levothyroxine (SYNTHROID/LEVOTHROID) 137 MCG tablet  has been requested by your pharmacy.  We noticed that it has been greater than 12 months since your last comprehensive visit and labs with Petr Banks MD.  A limited 90 day supply has been sent to your pharmacy at this time.    Additional refills require a medication management appointment.  Your health is very important to us.  Please call the clinic at [phone] to schedule your appointment.    Thank you,    The Refill Nurse  [facility]

## 2020-11-01 ENCOUNTER — MYC MEDICAL ADVICE (OUTPATIENT)
Dept: FAMILY MEDICINE | Facility: OTHER | Age: 55
End: 2020-11-01

## 2020-11-12 ENCOUNTER — OFFICE VISIT (OUTPATIENT)
Dept: FAMILY MEDICINE | Facility: OTHER | Age: 55
End: 2020-11-12
Attending: FAMILY MEDICINE
Payer: COMMERCIAL

## 2020-11-12 VITALS
HEIGHT: 73 IN | WEIGHT: 235 LBS | DIASTOLIC BLOOD PRESSURE: 76 MMHG | TEMPERATURE: 95.8 F | BODY MASS INDEX: 31.14 KG/M2 | OXYGEN SATURATION: 96 % | RESPIRATION RATE: 16 BRPM | HEART RATE: 104 BPM | SYSTOLIC BLOOD PRESSURE: 122 MMHG

## 2020-11-12 DIAGNOSIS — Z13.1 SCREENING FOR DIABETES MELLITUS: ICD-10-CM

## 2020-11-12 DIAGNOSIS — E03.8 OTHER SPECIFIED HYPOTHYROIDISM: ICD-10-CM

## 2020-11-12 DIAGNOSIS — Z13.220 LIPID SCREENING: ICD-10-CM

## 2020-11-12 DIAGNOSIS — Z00.00 ROUTINE GENERAL MEDICAL EXAMINATION AT A HEALTH CARE FACILITY: Primary | ICD-10-CM

## 2020-11-12 PROCEDURE — 99396 PREV VISIT EST AGE 40-64: CPT | Performed by: FAMILY MEDICINE

## 2020-11-12 RX ORDER — LEVOTHYROXINE SODIUM 137 UG/1
137 TABLET ORAL DAILY
Qty: 90 TABLET | Refills: 3 | Status: SHIPPED | OUTPATIENT
Start: 2020-11-12 | End: 2021-12-28

## 2020-11-12 ASSESSMENT — ANXIETY QUESTIONNAIRES
7. FEELING AFRAID AS IF SOMETHING AWFUL MIGHT HAPPEN: NOT AT ALL
2. NOT BEING ABLE TO STOP OR CONTROL WORRYING: NOT AT ALL
3. WORRYING TOO MUCH ABOUT DIFFERENT THINGS: NOT AT ALL
5. BEING SO RESTLESS THAT IT IS HARD TO SIT STILL: NOT AT ALL
IF YOU CHECKED OFF ANY PROBLEMS ON THIS QUESTIONNAIRE, HOW DIFFICULT HAVE THESE PROBLEMS MADE IT FOR YOU TO DO YOUR WORK, TAKE CARE OF THINGS AT HOME, OR GET ALONG WITH OTHER PEOPLE: NOT DIFFICULT AT ALL
GAD7 TOTAL SCORE: 0
1. FEELING NERVOUS, ANXIOUS, OR ON EDGE: NOT AT ALL
6. BECOMING EASILY ANNOYED OR IRRITABLE: NOT AT ALL

## 2020-11-12 ASSESSMENT — PATIENT HEALTH QUESTIONNAIRE - PHQ9: 5. POOR APPETITE OR OVEREATING: NOT AT ALL

## 2020-11-12 ASSESSMENT — MIFFLIN-ST. JEOR: SCORE: 1949.83

## 2020-11-12 ASSESSMENT — PAIN SCALES - GENERAL: PAINLEVEL: NO PAIN (0)

## 2020-11-12 NOTE — PROGRESS NOTES
3  SUBJECTIVE:   CC: Toño Olson is an 55 year old male who presents for preventive health visit.        Patient has been advised of split billing requirements and indicates understanding: Yes  Healthy Habits:    Do you get at least three servings of calcium containing foods daily (dairy, green leafy vegetables, etc.)? yes    Amount of exercise or daily activities, outside of work: 3-5 day(s) per week    Problems taking medications regularly No    Medication side effects: No    Have you had an eye exam in the past two years? yes    Do you see a dentist twice per year? yes    Do you have sleep apnea, excessive snoring or daytime drowsiness?Had a sleep study, was advised CPAP, but he has nt been able to use it.  Was not helping him feel better.      The 10-year ASCVD risk score (Kalebarianna FELIX Jr., et al., 2013) is: 6.7%    Values used to calculate the score:      Age: 55 years      Sex: Male      Is Non- : No      Diabetic: No      Tobacco smoker: No      Systolic Blood Pressure: 122 mmHg      Is BP treated: No      HDL Cholesterol: 45 mg/dL      Total Cholesterol: 216 mg/dL      Had left knee swelling after running a lot over the summer.  Not much pain just swelling.  Moving into an elliptical.      Right sided tennis elbow, improving with rest.      Today's PHQ-2 Score:   PHQ-2 ( 1999 Pfizer) 11/12/2020 2/10/2020   Q1: Little interest or pleasure in doing things 0 0   Q2: Feeling down, depressed or hopeless 0 0   PHQ-2 Score 0 0       Abuse: Current or Past(Physical, Sexual or Emotional)- No  Do you feel safe in your environment? Yes    Have you ever done Advance Care Planning? (For example, a Health Directive, POLST, or a discussion with a medical provider or your loved ones about your wishes): No, advance care planning information given to patient to review.  Patient plans to discuss their wishes with loved ones or provider.      Social History     Tobacco Use     Smoking status: Never  Smoker     Smokeless tobacco: Never Used   Substance Use Topics     Alcohol use: Yes     Alcohol/week: 4.2 standard drinks     Comment: Alcoholic Drinks/day: couple beers or wine a week     If you drink alcohol do you typically have >3 drinks per day or >7 drinks per week? No                      Last PSA: No results found for: PSA    Reviewed orders with patient. Reviewed health maintenance and updated orders accordingly - Yes  Lab work is in process  Labs reviewed in [facility]  Current Outpatient Medications   Medication Sig Dispense Refill     levothyroxine (SYNTHROID/LEVOTHROID) 137 MCG tablet Take 1 tablet (137 mcg) by mouth daily 90 tablet 3     Allergies   Allergen Reactions     Cats Shortness Of Breath       Reviewed and updated as needed this visit by clinical staff  Tobacco  Allergies  Meds   Med Hx    Soc Hx        Reviewed and updated as needed this visit by Provider                Past Medical History:   Diagnosis Date     Encounter for screening for cardiovascular disorders     12/2016     Pneumonia     Hospitalized as a child      Past Surgical History:   Procedure Laterality Date     COLONOSCOPY  09/05/2014    Hyperplastic polyps,  F/U 2024     TONSILLECTOMY      1969     VASECTOMY      1998       ROS:  CONSTITUTIONAL: NEGATIVE for fever, chills, change in weight  INTEGUMENTARY/SKIN: NEGATIVE for worrisome rashes, moles or lesions  EYES: NEGATIVE for vision changes or irritation  ENT: NEGATIVE for ear, mouth and throat problems  RESP: NEGATIVE for significant cough or SOB  CV: NEGATIVE for chest pain, palpitations or peripheral edema  GI: NEGATIVE for nausea, abdominal pain, heartburn, or change in bowel habits   male: negative for dysuria, hematuria, decreased urinary stream, erectile dysfunction, urethral discharge  MUSCULOSKELETAL: NEGATIVE for significant arthralgias or myalgia  NEURO: NEGATIVE for weakness, dizziness or paresthesias  PSYCHIATRIC: NEGATIVE for changes in mood or  "affect    OBJECTIVE:   /76   Pulse 104   Temp 95.8  F (35.4  C)   Resp 16   Ht 1.854 m (6' 1\")   Wt 106.6 kg (235 lb)   SpO2 96%   BMI 31.00 kg/m    EXAM:  GENERAL: healthy, alert and no distress  EYES: Eyes grossly normal to inspection, PERRL and conjunctivae and sclerae normal  HENT: ear canals and TM's normal, nose and mouth without ulcers or lesions  NECK: no adenopathy, no asymmetry, masses, or scars and thyroid normal to palpation  RESP: lungs clear to auscultation - no rales, rhonchi or wheezes  CV: regular rate and rhythm, normal S1 S2, no S3 or S4, no murmur, click or rub, no peripheral edema and peripheral pulses strong  ABDOMEN: soft, nontender, no hepatosplenomegaly, no masses and bowel sounds normal  MS: no gross musculoskeletal defects noted, no edema  SKIN: no suspicious lesions or rashes  NEURO: Normal strength and tone, mentation intact and speech normal  PSYCH: mentation appears normal, affect normal/bright    Diagnostic Test Results:  Labs reviewed in Epic    ASSESSMENT/PLAN:       ICD-10-CM    1. Routine general medical examination at a health care facility  Z00.00    2. Lipid screening  Z13.220 CANCELED: Lipid Profile   3. Screening for diabetes mellitus  Z13.1 CANCELED: Glucose   4. Other specified hypothyroidism  E03.8 levothyroxine (SYNTHROID/LEVOTHROID) 137 MCG tablet       Patient has been advised of split billing requirements and indicates understanding: Yes  COUNSELING:  Reviewed preventive health counseling, as reflected in patient instructions       Regular exercise       Healthy diet/nutrition    Estimated body mass index is 31 kg/m  as calculated from the following:    Height as of this encounter: 1.854 m (6' 1\").    Weight as of this encounter: 106.6 kg (235 lb).    Weight management plan: Discussed healthy diet and exercise guidelines    He reports that he has never smoked. He has never used smokeless tobacco.      Counseling Resources:  ATP IV Guidelines  Pooled " Cohorts Equation Calculator  FRAX Risk Assessment  ICSI Preventive Guidelines  Dietary Guidelines for Americans, 2010  USDA's MyPlate  ASA Prophylaxis  Lung CA Screening    Petr Banks MD  [facility]

## 2020-11-13 ASSESSMENT — ANXIETY QUESTIONNAIRES: GAD7 TOTAL SCORE: 0

## 2020-11-20 ENCOUNTER — ALLIED HEALTH/NURSE VISIT (OUTPATIENT)
Dept: FAMILY MEDICINE | Facility: OTHER | Age: 55
End: 2020-11-20
Attending: FAMILY MEDICINE
Payer: COMMERCIAL

## 2020-11-20 DIAGNOSIS — Z23 NEED FOR VACCINATION: Primary | ICD-10-CM

## 2020-11-20 PROCEDURE — 90750 HZV VACC RECOMBINANT IM: CPT

## 2020-11-20 PROCEDURE — 90471 IMMUNIZATION ADMIN: CPT

## 2020-11-20 NOTE — PROGRESS NOTES
Immunization: Adult  Verified patient's name and . Stated reason for visit today is to receive Shingrix vaccine(s). Denied any concerns with previous immunizations. Allergies reviewed.  Screening Questionnaire Adult Immunization completed (see below). VIS handout(s) reviewed and given to take home. Shingrix prepared and administered per standing order. Administration documented in IMMUNIZATIONS (see MIIC and order for further information). Instructed to wait in lobby for 15 minutes post-injection and notify RN immediately of any adverse reaction.     Screening Questionnaire for Adult Immunization    Are you sick today?   No   Do you have allergies to vaccines or vaccine components?   No   Have you ever had a serious reaction after receiving a vaccination?   No   Have you received any vaccinations in the past 4 weeks?   No     Immunization questionnaire answers were all negative.    Corrina Suresh RN on 2020 at 1:39 PM

## 2021-01-22 ENCOUNTER — ALLIED HEALTH/NURSE VISIT (OUTPATIENT)
Dept: FAMILY MEDICINE | Facility: OTHER | Age: 56
End: 2021-01-22
Attending: FAMILY MEDICINE
Payer: COMMERCIAL

## 2021-01-22 DIAGNOSIS — Z23 NEED FOR VACCINATION: Primary | ICD-10-CM

## 2021-01-22 PROCEDURE — 90471 IMMUNIZATION ADMIN: CPT

## 2021-01-22 PROCEDURE — 90750 HZV VACC RECOMBINANT IM: CPT

## 2021-01-22 NOTE — PROGRESS NOTES
Immunization: Adult  Verified patient's name and . Stated reason for visit today is to receive Shingrix vaccine(s). Denied any concerns with previous immunizations. Allergies reviewed.  Screening Questionnaire Adult Immunization completed (see below). VIS handout(s) reviewed and given to take home. Shingrix prepared and administered IM per standing order. Administration documented in IMMUNIZATIONS (see MIIC and order for further information). Instructed to wait in lobby for 15 minutes post-injection and notify RN immediately of any adverse reaction.     Screening Questionnaire for Adult Immunization    Are you sick today?   No   Do you have allergies to vaccines or vaccine components?   No   Have you ever had a serious reaction after receiving a vaccination?   No   Have you received any vaccinations in the past 4 weeks?   No     Immunization questionnaire answers were all negative.    Santiago Roque RN, BSN  ....................  2021   10:49 AM

## 2021-09-16 ENCOUNTER — MYC MEDICAL ADVICE (OUTPATIENT)
Dept: FAMILY MEDICINE | Facility: OTHER | Age: 56
End: 2021-09-16

## 2021-10-09 ENCOUNTER — HEALTH MAINTENANCE LETTER (OUTPATIENT)
Age: 56
End: 2021-10-09

## 2021-12-28 DIAGNOSIS — E03.8 OTHER SPECIFIED HYPOTHYROIDISM: ICD-10-CM

## 2021-12-28 RX ORDER — LEVOTHYROXINE SODIUM 137 UG/1
137 TABLET ORAL DAILY
Qty: 30 TABLET | Refills: 0 | Status: SHIPPED | OUTPATIENT
Start: 2021-12-28 | End: 2022-01-13

## 2021-12-28 NOTE — TELEPHONE ENCOUNTER
[id number] sent Rx request for the following:      Requested Prescriptions   Pending Prescriptions Disp Refills     levothyroxine (SYNTHROID/LEVOTHROID) 137 MCG tablet [Pharmacy Med Name: LEVOTHYROXINE 137MCG TABLET] 90 tablet 2     Sig: TAKE 1 TABLET (137 MCG) BY MOUTH DAILY       Thyroid Protocol Failed - 12/28/2021  9:13 AM        Failed - Normal TSH on file in past 12 months     Recent Labs   Lab Test 09/16/19  0000   TSH 1.34                Last Prescription Date:   11/12/2020  Last Fill Qty/Refills:         90, R-3    Last Office Visit:              11/12/2020  Future Office visit:           1/13/2022  Routing refill request to provider for review/approval because:    Patient has an appointment on 1/13/2022 and is in need of visit. Will give enough medication to get to appointment. Deborah Shi RN on 12/28/2021 at 9:16 AM

## 2022-01-10 DIAGNOSIS — E03.8 OTHER SPECIFIED HYPOTHYROIDISM: ICD-10-CM

## 2022-01-11 RX ORDER — LEVOTHYROXINE SODIUM 137 UG/1
137 TABLET ORAL DAILY
Qty: 30 TABLET | Refills: 0 | OUTPATIENT
Start: 2022-01-11

## 2022-01-11 NOTE — TELEPHONE ENCOUNTER
[id number] sent Rx request for the following:   levothyroxine (SYNTHROID/LEVOTHROID) 137 MCG tablet  SigTake 1 tablet (137 mcg) by mouth daily    Last Prescription Date:   12/28/2021  Last Fill Qty/Refills:         30, R-0    Last Office Visit:              11/12/2020 ([facility])   Future Office visit:           01/13/2021 ([facility])   Thyroid Protocol Failed - 1/10/2022 11:40 AM        Failed - Normal TSH on file in past 12 months     Recent Labs   Lab Test 09/16/19  0000   TSH 1.34           Rx to reach upcoming appointment. Unable to complete prescription refill per RN Medication Refill Policy.................... Genny Nelson RN ....................  1/11/2022   12:49 PM

## 2022-01-12 ENCOUNTER — MYC MEDICAL ADVICE (OUTPATIENT)
Dept: FAMILY MEDICINE | Facility: OTHER | Age: 57
End: 2022-01-12
Payer: COMMERCIAL

## 2022-01-12 DIAGNOSIS — E03.8 OTHER SPECIFIED HYPOTHYROIDISM: ICD-10-CM

## 2022-01-13 RX ORDER — LEVOTHYROXINE SODIUM 137 UG/1
137 TABLET ORAL DAILY
Qty: 90 TABLET | Refills: 4 | Status: SHIPPED | OUTPATIENT
Start: 2022-01-13 | End: 2022-02-02

## 2022-01-29 ENCOUNTER — HEALTH MAINTENANCE LETTER (OUTPATIENT)
Age: 57
End: 2022-01-29

## 2022-02-02 ENCOUNTER — OFFICE VISIT (OUTPATIENT)
Dept: FAMILY MEDICINE | Facility: OTHER | Age: 57
End: 2022-02-02
Attending: FAMILY MEDICINE
Payer: COMMERCIAL

## 2022-02-02 VITALS
OXYGEN SATURATION: 97 % | DIASTOLIC BLOOD PRESSURE: 72 MMHG | BODY MASS INDEX: 31.78 KG/M2 | HEART RATE: 90 BPM | SYSTOLIC BLOOD PRESSURE: 122 MMHG | RESPIRATION RATE: 16 BRPM | HEIGHT: 73 IN | TEMPERATURE: 97.8 F | WEIGHT: 239.8 LBS

## 2022-02-02 DIAGNOSIS — R79.0 SERUM IRON RAISED: ICD-10-CM

## 2022-02-02 DIAGNOSIS — Z00.00 ROUTINE GENERAL MEDICAL EXAMINATION AT A HEALTH CARE FACILITY: Primary | ICD-10-CM

## 2022-02-02 DIAGNOSIS — Z13.220 LIPID SCREENING: ICD-10-CM

## 2022-02-02 DIAGNOSIS — E03.8 OTHER SPECIFIED HYPOTHYROIDISM: ICD-10-CM

## 2022-02-02 LAB
ALBUMIN SERPL-MCNC: 4.7 G/DL (ref 3.5–5.7)
ALP SERPL-CCNC: 42 U/L (ref 34–104)
ALT SERPL W P-5'-P-CCNC: 16 U/L (ref 7–52)
ANION GAP SERPL CALCULATED.3IONS-SCNC: 7 MMOL/L (ref 3–14)
AST SERPL W P-5'-P-CCNC: 18 U/L (ref 13–39)
BILIRUB SERPL-MCNC: 1.1 MG/DL (ref 0.3–1)
BUN SERPL-MCNC: 9 MG/DL (ref 7–25)
CALCIUM SERPL-MCNC: 9.6 MG/DL (ref 8.6–10.3)
CHLORIDE BLD-SCNC: 104 MMOL/L (ref 98–107)
CHOLEST SERPL-MCNC: 240 MG/DL
CO2 SERPL-SCNC: 29 MMOL/L (ref 21–31)
CREAT SERPL-MCNC: 1.03 MG/DL (ref 0.7–1.3)
FASTING STATUS PATIENT QL REPORTED: ABNORMAL
GFR SERPL CREATININE-BSD FRML MDRD: 85 ML/MIN/1.73M2
GLUCOSE BLD-MCNC: 84 MG/DL (ref 70–105)
HDLC SERPL-MCNC: 47 MG/DL (ref 23–92)
IRON SATN MFR SERPL: 42 % (ref 20–55)
IRON SERPL-MCNC: 137 UG/DL (ref 50–212)
IRON SERPL-MCNC: 137 UG/DL (ref 50–212)
LDLC SERPL CALC-MCNC: 175 MG/DL
NONHDLC SERPL-MCNC: 193 MG/DL
POTASSIUM BLD-SCNC: 4.5 MMOL/L (ref 3.5–5.1)
PROT SERPL-MCNC: 6.6 G/DL (ref 6.4–8.9)
SODIUM SERPL-SCNC: 140 MMOL/L (ref 134–144)
TIBC SERPL-MCNC: 323.4 UG/DL (ref 245–400)
TRANSFERRIN SERPL-MCNC: 231 MG/DL (ref 203–362)
TRIGL SERPL-MCNC: 88 MG/DL
TSH SERPL DL<=0.005 MIU/L-ACNC: 0.84 MU/L (ref 0.4–4)
UIBC (UNSATURATED): 186.4 MG/DL

## 2022-02-02 PROCEDURE — 83550 IRON BINDING TEST: CPT | Mod: ZL | Performed by: FAMILY MEDICINE

## 2022-02-02 PROCEDURE — 99396 PREV VISIT EST AGE 40-64: CPT | Performed by: FAMILY MEDICINE

## 2022-02-02 PROCEDURE — 80053 COMPREHEN METABOLIC PANEL: CPT | Mod: ZL | Performed by: FAMILY MEDICINE

## 2022-02-02 PROCEDURE — 36415 COLL VENOUS BLD VENIPUNCTURE: CPT | Mod: ZL | Performed by: FAMILY MEDICINE

## 2022-02-02 PROCEDURE — 83540 ASSAY OF IRON: CPT | Mod: ZL,91 | Performed by: FAMILY MEDICINE

## 2022-02-02 PROCEDURE — 80061 LIPID PANEL: CPT | Mod: ZL | Performed by: FAMILY MEDICINE

## 2022-02-02 PROCEDURE — 84443 ASSAY THYROID STIM HORMONE: CPT | Mod: ZL | Performed by: FAMILY MEDICINE

## 2022-02-02 RX ORDER — LEVOTHYROXINE SODIUM 137 UG/1
137 TABLET ORAL DAILY
Qty: 90 TABLET | Refills: 4 | Status: SHIPPED | OUTPATIENT
Start: 2022-02-02 | End: 2023-04-17

## 2022-02-02 ASSESSMENT — PAIN SCALES - GENERAL: PAINLEVEL: NO PAIN (0)

## 2022-02-02 ASSESSMENT — MIFFLIN-ST. JEOR: SCORE: 1958.67

## 2022-02-02 NOTE — PROGRESS NOTES
SUBJECTIVE:   CC: Toño Olson is an 56 year old male who presents for preventative health visit.     He had screening labs at work, and fron was mildly elevated at 187 (), was normal the last 4 years.  PSA was normal on this, but lipids remain high and has questions about statins.     Healthy Habits:     Getting at least 3 servings of Calcium per day:  Yes    Bi-annual eye exam:  Yes    Dental care twice a year:  Yes    Sleep apnea or symptoms of sleep apnea:  Sleep apnea    Diet:  Regular (no restrictions)    Frequency of exercise:  2-3 days/week    Taking medications regularly:  Yes    Medication side effects:  None    PHQ-2 Total Score: 0    Additional concerns today:  No    Has CPAP, not using it.                Today's PHQ-2 Score:   PHQ-2 ( 1999 [employer]) 1/30/2022   Q1: Little interest or pleasure in doing things 0   Q2: Feeling down, depressed or hopeless 0   PHQ-2 Score 0   PHQ-2 Total Score (12-17 Years)- Positive if 3 or more points; Administer PHQ-A if positive -   Q1: Little interest or pleasure in doing things Not at all   Q2: Feeling down, depressed or hopeless Not at all   PHQ-2 Score 0       Abuse: Current or Past(Physical, Sexual or Emotional)- No  Do you feel safe in your environment? Yes        Social History     Tobacco Use     Smoking status: Never Smoker     Smokeless tobacco: Never Used   Substance Use Topics     Alcohol use: Yes     Alcohol/week: 4.2 standard drinks     Comment: Alcoholic Drinks/day: couple beers or wine a week         Alcohol Use 1/30/2022   Prescreen: >3 drinks/day or >7 drinks/week? No       Last PSA: No results found for: PSA    Reviewed orders with patient. Reviewed health maintenance and updated orders accordingly - Yes  Lab work is in process  Labs reviewed in [facility]  Current Outpatient Medications   Medication Sig Dispense Refill     levothyroxine (SYNTHROID/LEVOTHROID) 137 MCG tablet Take 1 tablet (137 mcg) by mouth daily 90 tablet 4     Allergies  "  Allergen Reactions     Cats Shortness Of Breath       Reviewed and updated as needed this visit by clinical staff  Tobacco  Allergies  Meds  Problems  Med Hx    Soc Hx       Reviewed and updated as needed this visit by Provider     Problems           Past Medical History:   Diagnosis Date     Encounter for screening for cardiovascular disorders     12/2016     Pneumonia     Hospitalized as a child      Past Surgical History:   Procedure Laterality Date     COLONOSCOPY  09/05/2014    Hyperplastic polyps,  F/U 2024     TONSILLECTOMY      1969     VASECTOMY      1998       Review of Systems  CONSTITUTIONAL: NEGATIVE for fever, chills, change in weight  INTEGUMENTARY/SKIN: NEGATIVE for worrisome rashes, moles or lesions  EYES: NEGATIVE for vision changes or irritation  ENT: NEGATIVE for ear, mouth and throat problems  RESP: NEGATIVE for significant cough or SOB  CV: NEGATIVE for chest pain, palpitations or peripheral edema  GI: NEGATIVE for nausea, abdominal pain, heartburn, or change in bowel habits   male: negative for dysuria, hematuria, decreased urinary stream, erectile dysfunction, urethral discharge  MUSCULOSKELETAL: NEGATIVE for significant arthralgias or myalgia  NEURO: NEGATIVE for weakness, dizziness or paresthesias  PSYCHIATRIC: NEGATIVE for changes in mood or affect    OBJECTIVE:   /72   Pulse 90   Temp 97.8  F (36.6  C)   Resp 16   Ht 1.842 m (6' 0.5\")   Wt 108.8 kg (239 lb 12.8 oz)   SpO2 97%   BMI 32.08 kg/m      Physical Exam  GENERAL: healthy, alert and no distress  EYES: Eyes grossly normal to inspection, PERRL and conjunctivae and sclerae normal  HENT: ear canals and TM's normal, nose and mouth without ulcers or lesions  NECK: no adenopathy, no asymmetry, masses, or scars and thyroid normal to palpation  RESP: lungs clear to auscultation - no rales, rhonchi or wheezes  CV: regular rate and rhythm, normal S1 S2, no S3 or S4, no murmur, click or rub, no peripheral edema and " peripheral pulses strong  ABDOMEN: soft, nontender, no hepatosplenomegaly, no masses and bowel sounds normal  MS: no gross musculoskeletal defects noted, no edema  SKIN: no suspicious lesions or rashes  NEURO: Normal strength and tone, mentation intact and speech normal  PSYCH: mentation appears normal, affect normal/bright    Diagnostic Test Results:  Labs reviewed in Epic  Results for orders placed or performed in visit on 02/02/22   TSH     Status: Normal   Result Value Ref Range    TSH 0.84 0.40 - 4.00 mU/L   Comprehensive Metabolic Panel     Status: Abnormal   Result Value Ref Range    Sodium 140 134 - 144 mmol/L    Potassium 4.5 3.5 - 5.1 mmol/L    Chloride 104 98 - 107 mmol/L    Carbon Dioxide (CO2) 29 21 - 31 mmol/L    Anion Gap 7 3 - 14 mmol/L    Urea Nitrogen 9 7 - 25 mg/dL    Creatinine 1.03 0.70 - 1.30 mg/dL    Calcium 9.6 8.6 - 10.3 mg/dL    Glucose 84 70 - 105 mg/dL    Alkaline Phosphatase 42 34 - 104 U/L    AST 18 13 - 39 U/L    ALT 16 7 - 52 U/L    Protein Total 6.6 6.4 - 8.9 g/dL    Albumin 4.7 3.5 - 5.7 g/dL    Bilirubin Total 1.1 (H) 0.3 - 1.0 mg/dL    GFR Estimate 85 >60 mL/min/1.73m2   Iron     Status: Normal   Result Value Ref Range    Iron 137 50 - 212 ug/dL   Iron Binding Panel     Status: None   Result Value Ref Range    Transferrin 231 203 - 362 mg/dL    Iron 137 50 - 212 ug/dL    UIBC (Unsaturated) 186.40 mg/dL    Iron Binding Capacity 323.40 245.00 - 400.00 ug/dL    Iron Saturation 42 20 - 55 %   Lipid Panel     Status: Abnormal   Result Value Ref Range    Cholesterol 240 (H) <200 mg/dL    Triglycerides 88 <150 mg/dL    Direct Measure HDL 47 23 - 92 mg/dL    LDL Cholesterol Calculated 175 (H) <=100 mg/dL    Non HDL Cholesterol 193 (H) <130 mg/dL    Patient Fasting > 8hrs? Unknown     Narrative    Cholesterol  Desirable:  <200 mg/dL    Triglycerides  Normal:  Less than 150 mg/dL  Borderline High:  150-199 mg/dL  High:  200-499 mg/dL  Very High:  Greater than or equal to 500 mg/dL    Direct  "Measure HDL  Female:  Greater than or equal to 50 mg/dL   Male:  Greater than or equal to 40 mg/dL    LDL Cholesterol  Desirable:  <100mg/dL  Above Desirable:  100-129 mg/dL   Borderline High:  130-159 mg/dL   High:  160-189 mg/dL   Very High:  >= 190 mg/dL    Non HDL Cholesterol  Desirable:  130 mg/dL  Above Desirable:  130-159 mg/dL  Borderline High:  160-189 mg/dL  High:  190-219 mg/dL  Very High:  Greater than or equal to 220 mg/dL         ASSESSMENT/PLAN:       ICD-10-CM    1. Routine general medical examination at a health care facility  Z00.00    2. Other specified hypothyroidism  E03.8 levothyroxine (SYNTHROID/LEVOTHROID) 137 MCG tablet     TSH     TSH     TSH     CANCELED: TSH   3. Serum iron raised  R79.0 Comprehensive Metabolic Panel     Iron     Iron Binding Panel     Comprehensive Metabolic Panel     Iron     Iron Binding Panel   4. Lipid screening  Z13.220 Lipid Panel     Lipid Panel     Iron is now normal. Perhaps it was elevated as an acute phase reactant, but this was just an isolated reading, with normal prior and subsequent labs, so no worries for hemochromatosis.    Patient has been advised of split billing requirements and indicates understanding: Yes    COUNSELING:   Reviewed preventive health counseling, as reflected in patient instructions       Regular exercise       Healthy diet/nutrition       Colorectal cancer screening       Prostate cancer screening    Estimated body mass index is 32.08 kg/m  as calculated from the following:    Height as of this encounter: 1.842 m (6' 0.5\").    Weight as of this encounter: 108.8 kg (239 lb 12.8 oz).     Weight management plan: Discussed healthy diet and exercise guidelines    He reports that he has never smoked. He has never used smokeless tobacco.      Counseling Resources:  ATP IV Guidelines  Pooled Cohorts Equation Calculator  FRAX Risk Assessment  ICSI Preventive Guidelines  Dietary Guidelines for Americans, 2010  USDA's MyPlate  ASA " Prophylaxis  Lung CA Screening    Petr Banks MD  [facility]

## 2022-02-02 NOTE — NURSING NOTE
"Coming in for a physical and medication refill    Chief Complaint   Patient presents with     Physical     medication, f/u on labs       Initial /72   Pulse 90   Temp 97.8  F (36.6  C)   Resp 16   Ht 1.842 m (6' 0.5\")   Wt 108.8 kg (239 lb 12.8 oz)   SpO2 97%   BMI 32.08 kg/m   Estimated body mass index is 32.08 kg/m  as calculated from the following:    Height as of this encounter: 1.842 m (6' 0.5\").    Weight as of this encounter: 108.8 kg (239 lb 12.8 oz).  Medication Reconciliation: complete.  FOOD SECURITY SCREENING QUESTIONS  Hunger Vital Signs:  Within the past 12 months we worried whether our food would run out before we got money to buy more. Never  Within the past 12 months the food we bought just didn't last and we didn't have money to get more. Never  Ailyn Mcknight LPN 2/2/2022 10:37 AM      Ailyn Mcknight LPN  "

## 2022-05-11 ENCOUNTER — TRANSFERRED RECORDS (OUTPATIENT)
Dept: HEALTH INFORMATION MANAGEMENT | Facility: OTHER | Age: 57
End: 2022-05-11
Payer: COMMERCIAL

## 2023-02-05 ENCOUNTER — MYC MEDICAL ADVICE (OUTPATIENT)
Dept: FAMILY MEDICINE | Facility: OTHER | Age: 58
End: 2023-02-05
Payer: COMMERCIAL

## 2023-02-05 DIAGNOSIS — Z13.220 LIPID SCREENING: Primary | ICD-10-CM

## 2023-03-03 ENCOUNTER — LAB (OUTPATIENT)
Dept: LAB | Facility: OTHER | Age: 58
End: 2023-03-03
Attending: FAMILY MEDICINE
Payer: COMMERCIAL

## 2023-03-03 DIAGNOSIS — Z12.5 SCREENING FOR PROSTATE CANCER: ICD-10-CM

## 2023-03-03 DIAGNOSIS — E03.8 OTHER SPECIFIED HYPOTHYROIDISM: Primary | ICD-10-CM

## 2023-03-03 DIAGNOSIS — Z13.220 LIPID SCREENING: ICD-10-CM

## 2023-03-03 LAB
CHOLEST SERPL-MCNC: 229 MG/DL
HDLC SERPL-MCNC: 49 MG/DL
LDLC SERPL CALC-MCNC: 152 MG/DL
NONHDLC SERPL-MCNC: 180 MG/DL
PSA SERPL-MCNC: 2.1 NG/ML (ref 0–3.5)
TRIGL SERPL-MCNC: 142 MG/DL
TSH SERPL DL<=0.005 MIU/L-ACNC: 2.44 UIU/ML (ref 0.3–4.2)

## 2023-03-03 PROCEDURE — G0103 PSA SCREENING: HCPCS | Mod: ZL

## 2023-03-03 PROCEDURE — 36415 COLL VENOUS BLD VENIPUNCTURE: CPT | Mod: ZL

## 2023-03-03 PROCEDURE — 84443 ASSAY THYROID STIM HORMONE: CPT | Mod: ZL

## 2023-03-03 PROCEDURE — 80061 LIPID PANEL: CPT | Mod: ZL

## 2023-03-10 ENCOUNTER — OFFICE VISIT (OUTPATIENT)
Dept: FAMILY MEDICINE | Facility: OTHER | Age: 58
End: 2023-03-10
Attending: FAMILY MEDICINE
Payer: COMMERCIAL

## 2023-03-10 VITALS
SYSTOLIC BLOOD PRESSURE: 132 MMHG | HEIGHT: 73 IN | OXYGEN SATURATION: 96 % | HEART RATE: 77 BPM | TEMPERATURE: 97 F | RESPIRATION RATE: 17 BRPM | BODY MASS INDEX: 32.31 KG/M2 | DIASTOLIC BLOOD PRESSURE: 82 MMHG | WEIGHT: 243.8 LBS

## 2023-03-10 DIAGNOSIS — Z00.00 ROUTINE GENERAL MEDICAL EXAMINATION AT A HEALTH CARE FACILITY: Primary | ICD-10-CM

## 2023-03-10 DIAGNOSIS — E78.00 HYPERCHOLESTEREMIA: ICD-10-CM

## 2023-03-10 PROCEDURE — 90746 HEPB VACCINE 3 DOSE ADULT IM: CPT | Performed by: FAMILY MEDICINE

## 2023-03-10 PROCEDURE — 99396 PREV VISIT EST AGE 40-64: CPT | Mod: 25 | Performed by: FAMILY MEDICINE

## 2023-03-10 PROCEDURE — 90471 IMMUNIZATION ADMIN: CPT | Performed by: FAMILY MEDICINE

## 2023-03-10 RX ORDER — ATORVASTATIN CALCIUM 10 MG/1
10 TABLET, FILM COATED ORAL DAILY
Qty: 90 TABLET | Refills: 4 | Status: SHIPPED | OUTPATIENT
Start: 2023-03-10 | End: 2024-02-14

## 2023-03-10 ASSESSMENT — ENCOUNTER SYMPTOMS
MYALGIAS: 0
ARTHRALGIAS: 0
HEARTBURN: 0
EYE PAIN: 0
NERVOUS/ANXIOUS: 0
HEADACHES: 0
HEMATOCHEZIA: 0
DIARRHEA: 0
FEVER: 0
SHORTNESS OF BREATH: 0
COUGH: 0
JOINT SWELLING: 0
SORE THROAT: 0
HEMATURIA: 0
DIZZINESS: 0
CHILLS: 0
DYSURIA: 0
NAUSEA: 0
CONSTIPATION: 0
ABDOMINAL PAIN: 0
PALPITATIONS: 0
PARESTHESIAS: 0
FREQUENCY: 0
WEAKNESS: 0

## 2023-03-10 ASSESSMENT — PAIN SCALES - GENERAL: PAINLEVEL: NO PAIN (0)

## 2023-03-10 NOTE — NURSING NOTE
"Chief Complaint   Patient presents with     Physical       Initial /82   Pulse 77   Temp 97  F (36.1  C) (Tympanic)   Resp 17   Ht 1.848 m (6' 0.75\")   Wt 110.6 kg (243 lb 12.8 oz)   SpO2 96%   BMI 32.39 kg/m   Estimated body mass index is 32.39 kg/m  as calculated from the following:    Height as of this encounter: 1.848 m (6' 0.75\").    Weight as of this encounter: 110.6 kg (243 lb 12.8 oz).  Medication Reconciliation: complete    FOOD SECURITY SCREENING QUESTIONS  Hunger Vital Signs:  Within the past 12 months we worried whether our food would run out before we got money to buy more. Never  Within the past 12 months the food we bought just didn't last and we didn't have money to get more. Never  Brianna Hardin LPN 3/10/2023 8:18 AM        "

## 2023-04-14 DIAGNOSIS — E03.8 OTHER SPECIFIED HYPOTHYROIDISM: ICD-10-CM

## 2023-04-17 RX ORDER — LEVOTHYROXINE SODIUM 137 UG/1
137 TABLET ORAL DAILY
Qty: 90 TABLET | Refills: 3 | Status: SHIPPED | OUTPATIENT
Start: 2023-04-17 | End: 2024-02-14

## 2023-04-17 NOTE — TELEPHONE ENCOUNTER
"Requested Prescriptions   Pending Prescriptions Disp Refills     levothyroxine (SYNTHROID/LEVOTHROID) 137 MCG tablet [Pharmacy Med Name: LEVOTHYROXINE 137MCG TABLET] 90 tablet 4     Sig: TAKE 1 TABLET (137 MCG) BY MOUTH DAILY       Thyroid Protocol Passed - 4/14/2023  1:00 AM        Passed - Patient is 12 years or older        Passed - Recent (12 mo) or future (30 days) visit within the authorizing provider's specialty     Patient has had an office visit with the authorizing provider or a provider within the authorizing providers department within the previous 12 mos or has a future within next 30 days. See \"Patient Info\" tab in inbasket, or \"Choose Columns\" in Meds & Orders section of the refill encounter.            Passed - Medication is active on med list        Passed - Normal TSH on file in past 12 months     Recent Labs   Lab Test 03/03/23  0946   TSH 2.44             Last Written Prescription Date:  2/2/22  Last Fill Quantity: 90,   # refills: 4  Last Office Visit: 3/3/23  Future Office visit:       Routing refill request to provider for review/approval because:  Drug not on the [facility], [facility] or [facility] refill protocol or controlled substance Anuradha Bell RN on 4/17/2023 at 11:53 AM        "

## 2023-09-29 ENCOUNTER — TRANSFERRED RECORDS (OUTPATIENT)
Dept: HEALTH INFORMATION MANAGEMENT | Facility: OTHER | Age: 58
End: 2023-09-29

## 2023-09-29 LAB
ALT SERPL-CCNC: 19 U/L (ref 9–46)
AST SERPL-CCNC: 17 U/L (ref 10–35)
CHOLESTEROL (EXTERNAL): 187 MG/DL (ref 125–199)
CREATININE (EXTERNAL): 1.05 MG/DL (ref 0.7–1.3)
GFR ESTIMATED (EXTERNAL): 82 ML/MIN/1.73M2
GLUCOSE (EXTERNAL): 77 MG/DL (ref 65–99)
HBA1C MFR BLD: 5.1 % (ref 4–5.6)
HDLC SERPL-MCNC: 50 MG/DL
LDL CHOLESTEROL CALCULATED (EXTERNAL): 113 MG/DL
NON HDL CHOLESTEROL (EXTERNAL): 137 MG/DL
TRIGLYCERIDES (EXTERNAL): 126 MG/DL
TSH SERPL-ACNC: 2.57 MIU/L (ref 0.4–4.5)

## 2023-11-21 ENCOUNTER — MYC MEDICAL ADVICE (OUTPATIENT)
Dept: FAMILY MEDICINE | Facility: OTHER | Age: 58
End: 2023-11-21
Payer: COMMERCIAL

## 2023-11-22 NOTE — TELEPHONE ENCOUNTER
Offer a visit with either Shelly Guadalupe, the PA who is great with orthopedics. Or can see Dr. Luis Garrido.

## 2023-12-07 ENCOUNTER — OFFICE VISIT (OUTPATIENT)
Dept: FAMILY MEDICINE | Facility: OTHER | Age: 58
End: 2023-12-07
Attending: PHYSICIAN ASSISTANT
Payer: COMMERCIAL

## 2023-12-07 VITALS
SYSTOLIC BLOOD PRESSURE: 132 MMHG | OXYGEN SATURATION: 97 % | BODY MASS INDEX: 31.54 KG/M2 | TEMPERATURE: 97.1 F | DIASTOLIC BLOOD PRESSURE: 78 MMHG | HEIGHT: 73 IN | HEART RATE: 69 BPM | RESPIRATION RATE: 16 BRPM | WEIGHT: 238 LBS

## 2023-12-07 DIAGNOSIS — G89.29 CHRONIC PAIN OF BOTH SHOULDERS: Primary | ICD-10-CM

## 2023-12-07 DIAGNOSIS — M19.012 ARTHRITIS OF BOTH ACROMIOCLAVICULAR JOINTS: ICD-10-CM

## 2023-12-07 DIAGNOSIS — M19.011 ARTHRITIS OF BOTH ACROMIOCLAVICULAR JOINTS: ICD-10-CM

## 2023-12-07 DIAGNOSIS — M25.512 CHRONIC PAIN OF BOTH SHOULDERS: Primary | ICD-10-CM

## 2023-12-07 DIAGNOSIS — M25.511 CHRONIC PAIN OF BOTH SHOULDERS: Primary | ICD-10-CM

## 2023-12-07 PROCEDURE — 99213 OFFICE O/P EST LOW 20 MIN: CPT | Performed by: PHYSICIAN ASSISTANT

## 2023-12-07 ASSESSMENT — PAIN SCALES - GENERAL: PAINLEVEL: NO PAIN (0)

## 2023-12-07 NOTE — PROGRESS NOTES
"  Assessment & Plan       ICD-10-CM    1. Chronic pain of both shoulders  M25.511 Physical Therapy Referral    G89.29     M25.512       2. Arthritis of both acromioclavicular joints  M19.011     M19.012         Chronic bilateral shoulder pain with AC joint arthritis presumed. No indication for imaging at this time, patient is agreeable with this and would prefer to hold off any ways. Mild decreased adduction on right and abduction bilaterally, mild positive Monreal bilaterally. Reviewed chronic shoulder pain and common etiologies. With near full range of motion and strength, along with near full ability to complete activities of daily living, MRI not indicated at this time. Would strongly recommend physical therapy, Toño has had PT at [facility] locally before and enjoyed their program, will refer Toño to [facility]. He will contact their department to schedule. In interim, use as tolerated, heat, ice, topical therapies (Voltaren, IcyHot, Bengay), sparing use of NSAIDs (Ibuprofen, Naproxen, Aleve), etc. I would recommend x-rays and possible orthopedic referral if persistent/worsening. Patient is in agreement and understanding of the above treatment plan. All questions and concerns were addressed and answered to patient's satisfaction. AVS reviewed with patient.      BMI:   Estimated body mass index is 31.4 kg/m  as calculated from the following:    Height as of this encounter: 1.854 m (6' 1\").    Weight as of this encounter: 108 kg (238 lb).   Weight management plan: Discussed healthy diet and exercise guidelines    See Patient Instructions    Return for Referral placed, follow up as directed.    Chelsea Guadalupe PA-C  [facility]    Subjective   Toño is a 58 year old, presenting for the following health issues:  Shoulder Pain        12/7/2023     8:06 AM   Additional Questions   Roomed by Susie SAM   Accompanied by Self       History of Present Illness       Reason for visit:  Shoulder  Symptom " "onset:  More than a month  Symptoms include:  Sore  Symptom intensity:  Moderate  Symptom progression:  Staying the same  Had these symptoms before:  No    He eats 4 or more servings of fruits and vegetables daily.He consumes 1 sweetened beverage(s) daily.He exercises with enough effort to increase his heart rate 10 to 19 minutes per day.  He exercises with enough effort to increase his heart rate 4 days per week.   He is taking medications regularly.     Toño presents today for bilateral shoulder pain:  - Started in the end of August, was up on a ladder and slid down the ladder and once the ladder hit the ground, he fell straight backwards on his back (arms/elbows flexed behind him). He is right hand dominant, right shoulder worse than left. He has had intermittent pain since this timeframe. Pain worse with range of motion out to the side and above head. No loss of strength. Bothersome at nighttime, dull ache, can make sleeping hard. No numbness, tingling or burning. + popping bilaterally. No previous falls, injuries, trauma or surgeries. No neck pain. Using Ibuprofen very sparingly.     Concern - Bilateral Shoulder Pain   Onset: End of August 2023   Description: Fell backwards off ladder   Intensity: mild, severe  Progression of Symptoms:  same  Accompanying Signs & Symptoms: N/A  Previous history of similar problem: N/A  Precipitating factors:        Worsened by: Activity (overhead)  Alleviating factors:        Improved by: Rest   Therapies tried and outcome: None    Review of Systems   Constitutional, HEENT, cardiovascular, pulmonary, GI, , musculoskeletal, neuro, skin, endocrine and psych systems are negative, except as otherwise noted.      Objective    /78 (BP Location: Left arm, Patient Position: Sitting, Cuff Size: Adult Regular)   Pulse 69   Temp 97.1  F (36.2  C) (Temporal)   Resp 16   Ht 1.854 m (6' 1\")   Wt 108 kg (238 lb)   SpO2 97%   BMI 31.40 kg/m    Body mass index is 31.4 " kg/m .  Physical Exam   GENERAL: healthy, alert and no distress  EYES: Eyes grossly normal to inspection, PERRL and conjunctivae and sclerae normal  RESP: lungs clear to auscultation - no rales, rhonchi or wheezes  CV: regular rate and rhythm, normal S1 S2, no S3 or S4, no murmur, click or rub, no peripheral edema and peripheral pulses strong  MS:   RIGHT SHOULDER PHYSICAL EXAMINATION:  Gross Examination: shoulders appear symmetrical in appearance, no signs of bony deformity over the AC, clavicle or glenohumeral joints. No signs of previous or current trauma. No signs of ecchymosis. Skin is intact.     Palpation: Tenderness to palpation: AC joint and upper trapezius region    ROM: flexion 180*, extension 60*, abduction 170*, adduction 40*, IR 70*, ER 90*    Special Testing:   Shoulder Strength:    Speed/Yergason (bicep): negative      Instability:    Apprehension: negative    Cross Over: negative     Impingement:    Neer: negative    Monreal: positive    Empty Can (supraspinatus): negative    Neurovascular status: distal pulses are intact and are 2+. Two point discrimination intact. Distal capillary refill intact < 3 seconds.     LEFT SHOULDER PHYSICAL EXAMINATION:  Gross Examination: shoulders appear symmetrical in appearance, no signs of bony deformity over the AC, clavicle or glenohumeral joints. No signs of previous or current trauma. No signs of ecchymosis. Skin is intact.     Palpation: Tenderness to palpation: AC joint and upper trapezius region    ROM: flexion 180*, extension 558, abduction 175*, adduction 45*, IR 70*, ER 90*    Special Testing:   Shoulder Strength:    Speed/Yergason (bicep): negative      Instability:    Apprehension: negative    Cross Over: negative     Impingement:    Neer: negative    Monreal: positive    Empty Can (supraspinatus): negative    Neurovascular status: distal pulses are intact and are 2+. Two point discrimination intact. Distal capillary refill intact < 3 seconds.     NEURO:  Normal strength and tone, mentation intact and speech normal  PSYCH: mentation appears normal, affect normal/bright

## 2023-12-07 NOTE — NURSING NOTE
"Chief Complaint   Patient presents with    Shoulder Pain       Initial /78 (BP Location: Left arm, Patient Position: Sitting, Cuff Size: Adult Regular)   Pulse 69   Temp 97.1  F (36.2  C) (Temporal)   Resp 16   Ht 1.854 m (6' 1\")   Wt 108 kg (238 lb)   SpO2 97%   BMI 31.40 kg/m   Estimated body mass index is 31.4 kg/m  as calculated from the following:    Height as of this encounter: 1.854 m (6' 1\").    Weight as of this encounter: 108 kg (238 lb).  Medication Review: complete    The next two questions are to help us understand your food security.  If you are feeling you need any assistance in this area, we have resources available to support you today.          12/7/2023   SDOH- Food Insecurity   Within the past 12 months, did you worry that your food would run out before you got money to buy more? N   Within the past 12 months, did the food you bought just not last and you didn t have money to get more? N         Health Care Directive:  Patient does not have a Health Care Directive or Living Will: Discussed advance care planning with patient; however, patient declined at this time.    Susei Langley LPN      "

## 2023-12-08 PROBLEM — M19.011 ARTHRITIS OF BOTH ACROMIOCLAVICULAR JOINTS: Status: ACTIVE | Noted: 2023-12-08

## 2023-12-08 PROBLEM — M25.511 CHRONIC PAIN OF BOTH SHOULDERS: Status: ACTIVE | Noted: 2023-12-08

## 2023-12-08 PROBLEM — M25.512 CHRONIC PAIN OF BOTH SHOULDERS: Status: ACTIVE | Noted: 2023-12-08

## 2023-12-08 PROBLEM — M19.012 ARTHRITIS OF BOTH ACROMIOCLAVICULAR JOINTS: Status: ACTIVE | Noted: 2023-12-08

## 2023-12-08 PROBLEM — G89.29 CHRONIC PAIN OF BOTH SHOULDERS: Status: ACTIVE | Noted: 2023-12-08

## 2023-12-14 ENCOUNTER — TRANSFERRED RECORDS (OUTPATIENT)
Dept: HEALTH INFORMATION MANAGEMENT | Facility: OTHER | Age: 58
End: 2023-12-14
Payer: COMMERCIAL

## 2024-01-03 ENCOUNTER — MYC MEDICAL ADVICE (OUTPATIENT)
Dept: FAMILY MEDICINE | Facility: OTHER | Age: 59
End: 2024-01-03
Payer: COMMERCIAL

## 2024-01-03 DIAGNOSIS — R79.0 SERUM IRON RAISED: ICD-10-CM

## 2024-01-03 DIAGNOSIS — Z00.00 PREVENTATIVE HEALTH CARE: Primary | ICD-10-CM

## 2024-01-03 DIAGNOSIS — E78.00 HYPERCHOLESTEREMIA: ICD-10-CM

## 2024-01-03 DIAGNOSIS — E03.8 OTHER SPECIFIED HYPOTHYROIDISM: ICD-10-CM

## 2024-01-16 ENCOUNTER — TRANSFERRED RECORDS (OUTPATIENT)
Dept: HEALTH INFORMATION MANAGEMENT | Facility: OTHER | Age: 59
End: 2024-01-16
Payer: COMMERCIAL

## 2024-02-07 SDOH — HEALTH STABILITY: PHYSICAL HEALTH: ON AVERAGE, HOW MANY MINUTES DO YOU ENGAGE IN EXERCISE AT THIS LEVEL?: PATIENT DECLINED

## 2024-02-07 SDOH — HEALTH STABILITY: PHYSICAL HEALTH
ON AVERAGE, HOW MANY DAYS PER WEEK DO YOU ENGAGE IN MODERATE TO STRENUOUS EXERCISE (LIKE A BRISK WALK)?: PATIENT DECLINED

## 2024-02-07 ASSESSMENT — SOCIAL DETERMINANTS OF HEALTH (SDOH): HOW OFTEN DO YOU GET TOGETHER WITH FRIENDS OR RELATIVES?: PATIENT DECLINED

## 2024-02-14 ENCOUNTER — OFFICE VISIT (OUTPATIENT)
Dept: FAMILY MEDICINE | Facility: OTHER | Age: 59
End: 2024-02-14
Attending: FAMILY MEDICINE
Payer: COMMERCIAL

## 2024-02-14 VITALS
TEMPERATURE: 97.1 F | SYSTOLIC BLOOD PRESSURE: 134 MMHG | WEIGHT: 240.2 LBS | BODY MASS INDEX: 30.83 KG/M2 | RESPIRATION RATE: 16 BRPM | OXYGEN SATURATION: 97 % | HEIGHT: 74 IN | HEART RATE: 82 BPM | DIASTOLIC BLOOD PRESSURE: 82 MMHG

## 2024-02-14 DIAGNOSIS — Z11.4 SCREENING FOR HIV (HUMAN IMMUNODEFICIENCY VIRUS): ICD-10-CM

## 2024-02-14 DIAGNOSIS — R79.0 SERUM IRON RAISED: ICD-10-CM

## 2024-02-14 DIAGNOSIS — Z11.59 NEED FOR HEPATITIS C SCREENING TEST: ICD-10-CM

## 2024-02-14 DIAGNOSIS — Z00.00 ROUTINE GENERAL MEDICAL EXAMINATION AT A HEALTH CARE FACILITY: Primary | ICD-10-CM

## 2024-02-14 DIAGNOSIS — E03.8 OTHER SPECIFIED HYPOTHYROIDISM: ICD-10-CM

## 2024-02-14 DIAGNOSIS — E78.00 HYPERCHOLESTEREMIA: ICD-10-CM

## 2024-02-14 DIAGNOSIS — Z12.11 COLON CANCER SCREENING: ICD-10-CM

## 2024-02-14 LAB
ALBUMIN SERPL BCG-MCNC: 4.8 G/DL (ref 3.5–5.2)
ALP SERPL-CCNC: 59 U/L (ref 40–150)
ALT SERPL W P-5'-P-CCNC: 20 U/L (ref 0–70)
ANION GAP SERPL CALCULATED.3IONS-SCNC: 9 MMOL/L (ref 7–15)
AST SERPL W P-5'-P-CCNC: 24 U/L (ref 0–45)
BILIRUB SERPL-MCNC: 1 MG/DL
BUN SERPL-MCNC: 13.3 MG/DL (ref 8–23)
CALCIUM SERPL-MCNC: 9.7 MG/DL (ref 8.6–10)
CHLORIDE SERPL-SCNC: 103 MMOL/L (ref 98–107)
CHOLEST SERPL-MCNC: 186 MG/DL
CREAT SERPL-MCNC: 1.06 MG/DL (ref 0.67–1.17)
DEPRECATED HCO3 PLAS-SCNC: 29 MMOL/L (ref 22–29)
EGFRCR SERPLBLD CKD-EPI 2021: 81 ML/MIN/1.73M2
ERYTHROCYTE [DISTWIDTH] IN BLOOD BY AUTOMATED COUNT: 13 % (ref 10–15)
FASTING STATUS PATIENT QL REPORTED: YES
GLUCOSE SERPL-MCNC: 87 MG/DL (ref 70–99)
HCT VFR BLD AUTO: 49.5 % (ref 40–53)
HCV AB SERPL QL IA: NONREACTIVE
HDLC SERPL-MCNC: 54 MG/DL
HGB BLD-MCNC: 16.5 G/DL (ref 13.3–17.7)
HIV 1+2 AB+HIV1 P24 AG SERPL QL IA: NONREACTIVE
IRON BINDING CAPACITY (ROCHE): 288 UG/DL (ref 240–430)
IRON SATN MFR SERPL: 55 % (ref 15–46)
IRON SERPL-MCNC: 159 UG/DL (ref 61–157)
LDLC SERPL CALC-MCNC: 112 MG/DL
MCH RBC QN AUTO: 30.8 PG (ref 26.5–33)
MCHC RBC AUTO-ENTMCNC: 33.3 G/DL (ref 31.5–36.5)
MCV RBC AUTO: 92 FL (ref 78–100)
NONHDLC SERPL-MCNC: 132 MG/DL
PLATELET # BLD AUTO: 215 10E3/UL (ref 150–450)
POTASSIUM SERPL-SCNC: 4.6 MMOL/L (ref 3.4–5.3)
PROT SERPL-MCNC: 7.3 G/DL (ref 6.4–8.3)
RBC # BLD AUTO: 5.36 10E6/UL (ref 4.4–5.9)
SODIUM SERPL-SCNC: 141 MMOL/L (ref 135–145)
TRANSFERRIN SERPL-MCNC: 234 MG/DL (ref 200–360)
TRIGL SERPL-MCNC: 100 MG/DL
TSH SERPL DL<=0.005 MIU/L-ACNC: 1.24 UIU/ML (ref 0.3–4.2)
WBC # BLD AUTO: 5.7 10E3/UL (ref 4–11)

## 2024-02-14 PROCEDURE — 84466 ASSAY OF TRANSFERRIN: CPT | Mod: ZL | Performed by: FAMILY MEDICINE

## 2024-02-14 PROCEDURE — 87389 HIV-1 AG W/HIV-1&-2 AB AG IA: CPT | Mod: ZL | Performed by: FAMILY MEDICINE

## 2024-02-14 PROCEDURE — 82465 ASSAY BLD/SERUM CHOLESTEROL: CPT | Mod: ZL | Performed by: FAMILY MEDICINE

## 2024-02-14 PROCEDURE — 83550 IRON BINDING TEST: CPT | Mod: ZL,XU | Performed by: FAMILY MEDICINE

## 2024-02-14 PROCEDURE — 80053 COMPREHEN METABOLIC PANEL: CPT | Mod: ZL | Performed by: FAMILY MEDICINE

## 2024-02-14 PROCEDURE — 85027 COMPLETE CBC AUTOMATED: CPT | Mod: ZL | Performed by: FAMILY MEDICINE

## 2024-02-14 PROCEDURE — 99396 PREV VISIT EST AGE 40-64: CPT | Performed by: FAMILY MEDICINE

## 2024-02-14 PROCEDURE — 84443 ASSAY THYROID STIM HORMONE: CPT | Mod: ZL | Performed by: FAMILY MEDICINE

## 2024-02-14 PROCEDURE — 86803 HEPATITIS C AB TEST: CPT | Mod: ZL | Performed by: FAMILY MEDICINE

## 2024-02-14 PROCEDURE — 36415 COLL VENOUS BLD VENIPUNCTURE: CPT | Mod: ZL | Performed by: FAMILY MEDICINE

## 2024-02-14 RX ORDER — ATORVASTATIN CALCIUM 10 MG/1
10 TABLET, FILM COATED ORAL DAILY
Qty: 90 TABLET | Refills: 4 | Status: SHIPPED | OUTPATIENT
Start: 2024-02-14

## 2024-02-14 RX ORDER — LEVOTHYROXINE SODIUM 137 UG/1
137 TABLET ORAL DAILY
Qty: 90 TABLET | Refills: 3 | Status: SHIPPED | OUTPATIENT
Start: 2024-02-14

## 2024-02-14 ASSESSMENT — PAIN SCALES - GENERAL: PAINLEVEL: MILD PAIN (3)

## 2024-02-14 NOTE — PATIENT INSTRUCTIONS
"Eating Healthy Foods: Care Instructions  With every meal, you can make healthy food choices. Try to eat a variety of fruits, vegetables, whole grains, lean proteins, and low-fat dairy products. This can help you get the right balance of nutrients, including vitamins and minerals. Small changes add up over time. You can start by adding one healthy food to your meals each day.    Try to make half your plate fruits and vegetables, one-fourth whole grains, and one-fourth lean proteins. Try including dairy with your meals.   Eat more fruits and vegetables. Try to have them with most meals and snacks.   Foods for healthy eating    Fruits    These can be fresh, frozen, canned, or dried.  Try to choose whole fruit rather than fruit juice.  Eat a variety of colors.    Vegetables    These can be fresh, frozen, canned, or dried.  Beans, peas, and lentils count too.    Whole grains    Choose whole-grain breads, cereals, and noodles.  Try brown rice.    Lean proteins    These can include lean meat, poultry, fish, and eggs.  You can also have tofu, beans, peas, lentils, nuts, and seeds.    Dairy    Try milk, yogurt, and cheese.  Choose low-fat or fat-free when you can.  If you need to, use lactose-free milk or fortified plant-based milk products, such as soy milk.    Water    Drink water when you're thirsty.  Limit sugar-sweetened drinks, including soda, fruit drinks, and sports drinks.  Where can you learn more?  Go to https://www.[employer].net/patiented  Enter T756 in the search box to learn more about \"Eating Healthy Foods: Care Instructions.\"  Current as of: February 28, 2023               Content Version: 13.8    0303-0787 [employer].   Care instructions adapted under license by your healthcare professional. If you have questions about a medical condition or this instruction, always ask your healthcare professional. [employer] disclaims any warranty or liability for your use of this " information.      Substance Use Disorder: Care Instructions  Overview     You can improve your life and health by stopping your use of alcohol or drugs. When you don't drink or use drugs, you may feel and sleep better. You may get along better with your family, friends, and coworkers. There are medicines and programs that can help with substance use disorder.  How can you care for yourself at home?  Here are some ways to help you stay sober and prevent relapse.  If you have been given medicine to help keep you sober or reduce your cravings, be sure to take it exactly as prescribed.  Talk to your doctor about programs that can help you stop using drugs or drinking alcohol.  Do not keep alcohol or drugs in your home.  Plan ahead. Think about what you'll say if other people ask you to drink or use drugs. Try not to spend time with people who drink or use drugs.  Use the time and money spent on drinking or drugs to do something that's important to you.  Preventing a relapse  Have a plan to deal with relapse. Learn to recognize changes in your thinking that lead you to drink or use drugs. Get help before you start to drink or use drugs again.  Try to stay away from situations, friends, or places that may lead you to drink or use drugs.  If you feel the need to drink alcohol or use drugs again, seek help right away. Call a trusted friend or family member. Some people get support from organizations such as Narcotics Anonymous or [employer] or from treatment facilities.  If you relapse, get help as soon as you can. Some people make a plan with another person that outlines what they want that person to do for them if they relapse. The plan usually includes how to handle the relapse and who to notify in case of relapse.  Don't give up. Remember that a relapse doesn't mean that you have failed. Use the experience to learn the triggers that lead you to drink or use drugs. Then quit again. Recovery is a lifelong process.  "Many people have several relapses before they are able to quit for good.  Follow-up care is a key part of your treatment and safety. Be sure to make and go to all appointments, and call your doctor if you are having problems. It's also a good idea to know your test results and keep a list of the medicines you take.  When should you call for help?   Call 911  anytime you think you may need emergency care. For example, call if you or someone else:    Has overdosed or has withdrawal signs. Be sure to tell the emergency workers that you are or someone else is using or trying to quit using drugs. Overdose or withdrawal signs may include:  Losing consciousness.  Seizure.  Seeing or hearing things that aren't there (hallucinations).     Is thinking or talking about suicide or harming others.   Where to get help 24 hours a day, 7 days a week   If you or someone you know talks about suicide, self-harm, a mental health crisis, a substance use crisis, or any other kind of emotional distress, get help right away. You can:    Call the Suicide and Crisis Lifeline at 988.     Call 2-828-355-DUPE (1-437.432.4031).     Text HOME to 201390 to access the Crisis Text Line.   Consider saving these numbers in your phone.  Go to [employer].org for more information or to chat online.  Call your doctor now or seek immediate medical care if:    You are having withdrawal symptoms. These may include nausea or vomiting, sweating, shakiness, and anxiety.   Watch closely for changes in your health, and be sure to contact your doctor if:    You have a relapse.     You need more help or support to stop.   Where can you learn more?  Go to https://www.healthElectrikus.net/patiented  Enter H573 in the search box to learn more about \"Substance Use Disorder: Care Instructions.\"  Current as of: March 21, 2023               Content Version: 13.8    5328-7195 [employer].   Care instructions adapted under license by your healthcare professional. If " you have questions about a medical condition or this instruction, always ask your healthcare professional. [employer], [facility] disclaims any warranty or liability for your use of this information.      Preventive Care Advice   This is general advice given by our system to help you stay healthy. However, your care team may have specific advice just for you. Please talk to your care team about your preventive care needs.  Nutrition  Eat 5 or more servings of fruits and vegetables each day.  Try wheat bread, brown rice and whole grain pasta (instead of white bread, rice, and pasta).  Get enough calcium and vitamin D. Check the label on foods and aim for 100% of the RDA (recommended daily allowance).  Lifestyle  Exercise at least 150 minutes each week  (30 minutes a day, 5 days a week).  Do muscle strengthening activities 2 days a week. These help control your weight and prevent disease.  No smoking.  Wear sunscreen to prevent skin cancer.  Have a dental exam and cleaning every 6 months.  Yearly exams  See your health care team every year to talk about:  Any changes in your health.  Any medicines your care team has prescribed.  Preventive care, family planning, and ways to prevent chronic diseases.  Shots (vaccines)   HPV shots (up to age 26), if you've never had them before.  Hepatitis B shots (up to age 59), if you've never had them before.  COVID-19 shot: Get this shot when it's due.  Flu shot: Get a flu shot every year.  Tetanus shot: Get a tetanus shot every 10 years.  Pneumococcal, hepatitis A, and RSV shots: Ask your care team if you need these based on your risk.  Shingles shot (for age 50 and up)  General health tests  Diabetes screening:  Starting at age 35, Get screened for diabetes at least every 3 years.  If you are younger than age 35, ask your care team if you should be screened for diabetes.  Cholesterol test: At age 39, start having a cholesterol test every 5 years, or more often if advised.  Bone  density scan (DEXA): At age 50, ask your care team if you should have this scan for osteoporosis (brittle bones).  Hepatitis C: Get tested at least once in your life.  STIs (sexually transmitted infections)  Before age 24: Ask your care team if you should be screened for STIs.  After age 24: Get screened for STIs if you're at risk. You are at risk for STIs (including HIV) if:  You are sexually active with more than one person.  You don't use condoms every time.  You or a partner was diagnosed with a sexually transmitted infection.  If you are at risk for HIV, ask about PrEP medicine to prevent HIV.  Get tested for HIV at least once in your life, whether you are at risk for HIV or not.  Cancer screening tests  Cervical cancer screening: If you have a cervix, begin getting regular cervical cancer screening tests starting at age 21.  Breast cancer scan (mammogram): If you've ever had breasts, begin having regular mammograms starting at age 40. This is a scan to check for breast cancer.  Colon cancer screening: It is important to start screening for colon cancer at age 45.  Have a colonoscopy test every 10 years (or more often if you're at risk) Or, ask your provider about stool tests like a FIT test every year or Cologuard test every 3 years.  To learn more about your testing options, visit:   https://www.[employer]/598688.pdf.  For help making a decision, visit:   https://bit.ly/yn75023.  Prostate cancer screening test: If you have a prostate, ask your care team if a prostate cancer screening test (PSA) at age 55 is right for you.  Lung cancer screening: If you are a current or former smoker ages 50 to 80, ask your care team if ongoing lung cancer screenings are right for you.  For informational purposes only. Not to replace the advice of your health care provider. Copyright   2023 [employer]. All rights reserved. Clinically reviewed by the  [facility]. [employer] 290950 - REV  01/24.

## 2024-02-14 NOTE — NURSING NOTE
"Chief Complaint   Patient presents with    Physical       Initial /82   Pulse 82   Temp 97.1  F (36.2  C) (Tympanic)   Resp 16   Ht 1.873 m (6' 1.75\")   Wt 109 kg (240 lb 3.2 oz)   SpO2 97%   BMI 31.05 kg/m   Estimated body mass index is 31.05 kg/m  as calculated from the following:    Height as of this encounter: 1.873 m (6' 1.75\").    Weight as of this encounter: 109 kg (240 lb 3.2 oz).  Medication Reconciliation: complete          "

## 2024-02-14 NOTE — PROGRESS NOTES
Preventive Care Visit  [facility]  Petr Banks MD, Family Medicine  Feb 14, 2024    Assessment & Plan     (Z00.00) Routine general medical examination at a health care facility  (primary encounter diagnosis)  Comment: see below  Plan:      (Z11.4) Screening for HIV (human immunodeficiency virus)  Comment:    Plan: HIV Screening             (Z11.59) Need for hepatitis C screening test  Comment:    Plan: Hepatitis C Screen Reflex to HCV RNA Quant and         Genotype             (E78.00) Hypercholesteremia  Comment: stable  Plan: atorvastatin (LIPITOR) 10 MG tablet, Lipid         Panel        Refilled without change    (E03.8) Other specified hypothyroidism  Comment: stable  Plan: levothyroxine (SYNTHROID/LEVOTHROID) 137 MCG         tablet, TSH        Refilled without change    (R79.0) Serum iron raised  Comment: this has been mildly elevated off and on through many years. It is possible he has very mild hereditary hemochromatosis, but this is also an acute phase reactant and can rise for many other reasons. Discussed with all of this with him, he was quite interested. For now, I advise yearly checks. Genetic testing is not clearly needed yet.   Plan: Comprehensive Metabolic Panel, Iron & Iron         Binding Capacity, Transferrin, CBC W PLT No         Diff             (Z12.11) Colon cancer screening  Comment:     Plan: Colonoscopy Screening  Referral                     Counseling  Appropriate preventive services were discussed with this patient, including applicable screening as appropriate for fall prevention, nutrition, physical activity, Tobacco-use cessation, weight loss and cognition.  Checklist reviewing preventive services available has been given to the patient.            Return in about 53 weeks (around 2/19/2025) for Annual Wellness Visit.    Cece Lundberg is a 58 year old, presenting for the following:  Physical        2/14/2024     8:01 AM   Additional Questions    Roomed by CAMILLE Reed   Accompanied by Self         2/14/2024     8:01 AM   Patient Reported Additional Medications   Patient reports taking the following new medications N/A        Health Care Directive  Patient does not have a Health Care Directive or Living Will: Discussed advance care planning with patient; information given to patient to review.    HPI  Gets labs at work, and is total iron is slowly rising. Wants to get follow up labs. No FH of hemochromatosis.     His statin has lowered his LDL by 100, is pleased and has no side effects.             2/7/2024   General Health   How would you rate your overall physical health? Good   Feel stress (tense, anxious, or unable to sleep) Not at all         2/7/2024   Nutrition   Three or more servings of calcium each day? Yes   Diet: Regular (no restrictions)   How many servings of fruit and vegetables per day? 4 or more   How many sweetened beverages each day? 0-1         2/7/2024   Exercise   Days per week of moderate/strenous exercise Patient declined   Average minutes spent exercising at this level Patient declined         2/7/2024   Social Factors   Frequency of gathering with friends or relatives Patient declined   Worry food won't last until get money to buy more No   Food not last or not have enough money for food? No   Do you have housing?  Yes   Are you worried about losing your housing? No   Lack of transportation? No   Unable to get utilities (heat,electricity)? No         2/7/2024   Fall Risk   Fallen 2 or more times in the past year? No   Trouble with walking or balance? No          2/7/2024   Dental   Dentist two times every year? Yes         2/7/2024   TB Screening   Were you born outside of [place]?  (!) YES              Today's PHQ-2 Score:       2/14/2024     8:05 AM   PHQ-2 ( 1999 [employer])   Q1: Little interest or pleasure in doing things 0   Q2: Feeling down, depressed or hopeless 0   PHQ-2 Score 0         2/7/2024   Substance Use   Alcohol more  "than 3/day or more than 7/wk No   Do you use any other substances recreationally? (!) ALCOHOL     Social History     Tobacco Use    Smoking status: Never    Smokeless tobacco: Never   Vaping Use    Vaping Use: Never used   Substance Use Topics    Alcohol use: Yes     Alcohol/week: 4.2 standard drinks of alcohol     Comment: Alcoholic Drinks/day: couple beers or wine a week    Drug use: Never             2/7/2024   One time HIV Screening   Previous HIV test? No         2/7/2024   STI Screening   New sexual partner(s) since last STI/HIV test? No   Last PSA:   Prostate Specific Antigen Screen   Date Value Ref Range Status   03/03/2023 2.10 0.00 - 3.50 ng/mL Final     The 10-year ASCVD risk score (Mina BUTCHER, et al., 2019) is: 8%    Values used to calculate the score:      Age: 58 years      Sex: Male      Is Non- : No      Diabetic: No      Tobacco smoker: No      Systolic Blood Pressure: 134 mmHg      Is BP treated: No      HDL Cholesterol: 50 mg/dL      Total Cholesterol: 187 mg/dL           Reviewed and updated as needed this visit by Provider                             Objective    Exam  /82   Pulse 82   Temp 97.1  F (36.2  C) (Tympanic)   Resp 16   Ht 1.873 m (6' 1.75\")   Wt 109 kg (240 lb 3.2 oz)   SpO2 97%   BMI 31.05 kg/m     Estimated body mass index is 31.05 kg/m  as calculated from the following:    Height as of this encounter: 1.873 m (6' 1.75\").    Weight as of this encounter: 109 kg (240 lb 3.2 oz).    Physical Exam  GENERAL: alert and no distress  EYES: Eyes grossly normal to inspection, PERRL and conjunctivae and sclerae normal  HENT: ear canals and TM's normal, nose and mouth without ulcers or lesions  NECK: no adenopathy, no asymmetry, masses, or scars  RESP: lungs clear to auscultation - no rales, rhonchi or wheezes  CV: regular rate and rhythm, normal S1 S2, no S3 or S4, no murmur, click or rub, no peripheral edema  ABDOMEN: soft, nontender, no " hepatosplenomegaly, no masses and bowel sounds normal  MS: no gross musculoskeletal defects noted, no edema  SKIN: no suspicious lesions or rashes  NEURO: Normal strength and tone, mentation intact and speech normal  PSYCH: mentation appears normal, affect normal/bright    Results for orders placed or performed in visit on 02/14/24   HIV Screening     Status: Normal   Result Value Ref Range    HIV Antigen Antibody Combo Nonreactive Nonreactive   Hepatitis C Screen Reflex to HCV RNA Quant and Genotype     Status: Normal   Result Value Ref Range    Hepatitis C Antibody Nonreactive Nonreactive   Lipid Panel     Status: Abnormal   Result Value Ref Range    Cholesterol 186 <200 mg/dL    Triglycerides 100 <150 mg/dL    Direct Measure HDL 54 >=40 mg/dL    LDL Cholesterol Calculated 112 (H) <=100 mg/dL    Non HDL Cholesterol 132 (H) <130 mg/dL    Patient Fasting > 8hrs? Yes     Narrative    Cholesterol  Desirable:  <200 mg/dL    Triglycerides  Normal:  Less than 150 mg/dL  Borderline High:  150-199 mg/dL  High:  200-499 mg/dL  Very High:  Greater than or equal to 500 mg/dL    Direct Measure HDL  Female:  Greater than or equal to 50 mg/dL   Male:  Greater than or equal to 40 mg/dL    LDL Cholesterol  Desirable:  <100mg/dL  Above Desirable:  100-129 mg/dL   Borderline High:  130-159 mg/dL   High:  160-189 mg/dL   Very High:  >= 190 mg/dL    Non HDL Cholesterol  Desirable:  130 mg/dL  Above Desirable:  130-159 mg/dL  Borderline High:  160-189 mg/dL  High:  190-219 mg/dL  Very High:  Greater than or equal to 220 mg/dL   TSH     Status: Normal   Result Value Ref Range    TSH 1.24 0.30 - 4.20 uIU/mL   Comprehensive Metabolic Panel     Status: Normal   Result Value Ref Range    Sodium 141 135 - 145 mmol/L    Potassium 4.6 3.4 - 5.3 mmol/L    Carbon Dioxide (CO2) 29 22 - 29 mmol/L    Anion Gap 9 7 - 15 mmol/L    Urea Nitrogen 13.3 8.0 - 23.0 mg/dL    Creatinine 1.06 0.67 - 1.17 mg/dL    GFR Estimate 81 >60 mL/min/1.73m2    Calcium  9.7 8.6 - 10.0 mg/dL    Chloride 103 98 - 107 mmol/L    Glucose 87 70 - 99 mg/dL    Alkaline Phosphatase 59 40 - 150 U/L    AST 24 0 - 45 U/L    ALT 20 0 - 70 U/L    Protein Total 7.3 6.4 - 8.3 g/dL    Albumin 4.8 3.5 - 5.2 g/dL    Bilirubin Total 1.0 <=1.2 mg/dL   Iron & Iron Binding Capacity     Status: Abnormal   Result Value Ref Range    Iron 159 (H) 61 - 157 ug/dL    Iron Binding Capacity 288 240 - 430 ug/dL    Iron Sat Index 55 (H) 15 - 46 %   Transferrin     Status: Normal   Result Value Ref Range    Transferrin 234.0 200.0 - 360.0 mg/dL   CBC W PLT No Diff     Status: Normal   Result Value Ref Range    WBC Count 5.7 4.0 - 11.0 10e3/uL    RBC Count 5.36 4.40 - 5.90 10e6/uL    Hemoglobin 16.5 13.3 - 17.7 g/dL    Hematocrit 49.5 40.0 - 53.0 %    MCV 92 78 - 100 fL    MCH 30.8 26.5 - 33.0 pg    MCHC 33.3 31.5 - 36.5 g/dL    RDW 13.0 10.0 - 15.0 %    Platelet Count 215 150 - 450 10e3/uL       Signed Electronically by: Petr Banks MD

## 2024-02-16 ENCOUNTER — TELEPHONE (OUTPATIENT)
Dept: SURGERY | Facility: OTHER | Age: 59
End: 2024-02-16
Payer: COMMERCIAL

## 2024-02-16 ENCOUNTER — TRANSFERRED RECORDS (OUTPATIENT)
Dept: HEALTH INFORMATION MANAGEMENT | Facility: OTHER | Age: 59
End: 2024-02-16
Payer: COMMERCIAL

## 2024-02-16 DIAGNOSIS — Z12.11 ENCOUNTER FOR SCREENING COLONOSCOPY: Primary | ICD-10-CM

## 2024-02-16 NOTE — TELEPHONE ENCOUNTER
Screening Questions for the Scheduling of Screening Colonoscopies   (If Colonoscopy is diagnostic, Provider should review the chart before scheduling.)  Are you younger than 45 or older than 80?  NO  Do you take aspirin or fish oil?  NO (if yes, tell patient to stop 1 week prior to Colonoscopy)  Do you take warfarin (Coumadin), clopidogrel (Plavix), apixaban (Eliquis), dabigatram (Pradaxa), rivaroxaban (Xarelto) or any blood thinner? NO  Do you take Ozempic? NO  Do you use oxygen or a CPAP at home?  NO  Do you have kidney disease? NO  Are you on dialysis? NO  Have you had a stroke or heart attack in the last year? NO  Have you had a stent in your heart or any blood vessel in the last year? NO  Have you had a transplant of any organ? NO  Have you had a colonoscopy or upper endoscopy (EGD) before? YES         When?  2014  Date of scheduled Colonoscopy. 05/24/2024  Provider [facility]

## 2024-02-19 RX ORDER — POLYETHYLENE GLYCOL 3350, SODIUM CHLORIDE, SODIUM BICARBONATE, POTASSIUM CHLORIDE 420; 11.2; 5.72; 1.48 G/4L; G/4L; G/4L; G/4L
4000 POWDER, FOR SOLUTION ORAL ONCE
Qty: 4000 ML | Refills: 0 | Status: SHIPPED | OUTPATIENT
Start: 2024-05-17 | End: 2024-05-17

## 2024-02-19 RX ORDER — BISACODYL 5 MG/1
TABLET, DELAYED RELEASE ORAL
Qty: 2 TABLET | Refills: 0 | Status: ON HOLD | OUTPATIENT
Start: 2024-05-17 | End: 2024-05-24

## 2024-05-24 ENCOUNTER — ANESTHESIA EVENT (OUTPATIENT)
Dept: SURGERY | Facility: OTHER | Age: 59
End: 2024-05-24
Payer: COMMERCIAL

## 2024-05-24 ENCOUNTER — ANESTHESIA (OUTPATIENT)
Dept: SURGERY | Facility: OTHER | Age: 59
End: 2024-05-24
Payer: COMMERCIAL

## 2024-05-24 ENCOUNTER — HOSPITAL ENCOUNTER (OUTPATIENT)
Facility: OTHER | Age: 59
Discharge: HOME | End: 2024-05-24
Attending: SURGERY | Admitting: SURGERY
Payer: COMMERCIAL

## 2024-05-24 VITALS
HEIGHT: 73 IN | WEIGHT: 231 LBS | DIASTOLIC BLOOD PRESSURE: 90 MMHG | OXYGEN SATURATION: 95 % | BODY MASS INDEX: 30.62 KG/M2 | RESPIRATION RATE: 18 BRPM | HEART RATE: 76 BPM | TEMPERATURE: 97.2 F | SYSTOLIC BLOOD PRESSURE: 128 MMHG

## 2024-05-24 PROCEDURE — 45378 DIAGNOSTIC COLONOSCOPY: CPT | Performed by: SURGERY

## 2024-05-24 PROCEDURE — G0121 COLON CA SCRN NOT HI RSK IND: HCPCS | Performed by: SURGERY

## 2024-05-24 PROCEDURE — 45378 DIAGNOSTIC COLONOSCOPY: CPT | Performed by: NURSE ANESTHETIST, CERTIFIED REGISTERED

## 2024-05-24 PROCEDURE — 258N000003 HC RX IP 258 OP 636: Performed by: SURGERY

## 2024-05-24 PROCEDURE — 250N000009 HC RX 250: Performed by: NURSE ANESTHETIST, CERTIFIED REGISTERED

## 2024-05-24 PROCEDURE — 250N000011 HC RX IP 250 OP 636: Performed by: NURSE ANESTHETIST, CERTIFIED REGISTERED

## 2024-05-24 RX ORDER — SODIUM CHLORIDE, SODIUM LACTATE, POTASSIUM CHLORIDE, CALCIUM CHLORIDE 600; 310; 30; 20 MG/100ML; MG/100ML; MG/100ML; MG/100ML
INJECTION, SOLUTION INTRAVENOUS CONTINUOUS
Status: DISCONTINUED | OUTPATIENT
Start: 2024-05-24 | End: 2024-05-24 | Stop reason: HOSPADM

## 2024-05-24 RX ORDER — PROPOFOL 10 MG/ML
INJECTION, EMULSION INTRAVENOUS CONTINUOUS PRN
Status: DISCONTINUED | OUTPATIENT
Start: 2024-05-24 | End: 2024-05-24

## 2024-05-24 RX ORDER — NALOXONE HYDROCHLORIDE 0.4 MG/ML
0.2 INJECTION, SOLUTION INTRAMUSCULAR; INTRAVENOUS; SUBCUTANEOUS
Status: DISCONTINUED | OUTPATIENT
Start: 2024-05-24 | End: 2024-05-24 | Stop reason: HOSPADM

## 2024-05-24 RX ORDER — PROPOFOL 10 MG/ML
INJECTION, EMULSION INTRAVENOUS PRN
Status: DISCONTINUED | OUTPATIENT
Start: 2024-05-24 | End: 2024-05-24

## 2024-05-24 RX ORDER — NALOXONE HYDROCHLORIDE 0.4 MG/ML
0.4 INJECTION, SOLUTION INTRAMUSCULAR; INTRAVENOUS; SUBCUTANEOUS
Status: DISCONTINUED | OUTPATIENT
Start: 2024-05-24 | End: 2024-05-24 | Stop reason: HOSPADM

## 2024-05-24 RX ORDER — FLUMAZENIL 0.1 MG/ML
0.2 INJECTION, SOLUTION INTRAVENOUS
Status: DISCONTINUED | OUTPATIENT
Start: 2024-05-24 | End: 2024-05-24 | Stop reason: HOSPADM

## 2024-05-24 RX ORDER — ONDANSETRON 2 MG/ML
4 INJECTION INTRAMUSCULAR; INTRAVENOUS
Status: DISCONTINUED | OUTPATIENT
Start: 2024-05-24 | End: 2024-05-24 | Stop reason: HOSPADM

## 2024-05-24 RX ORDER — LIDOCAINE HYDROCHLORIDE 20 MG/ML
INJECTION, SOLUTION INFILTRATION; PERINEURAL PRN
Status: DISCONTINUED | OUTPATIENT
Start: 2024-05-24 | End: 2024-05-24

## 2024-05-24 RX ORDER — LIDOCAINE 40 MG/G
CREAM TOPICAL
Status: DISCONTINUED | OUTPATIENT
Start: 2024-05-24 | End: 2024-05-24 | Stop reason: HOSPADM

## 2024-05-24 RX ADMIN — PROPOFOL 150 MCG/KG/MIN: 10 INJECTION, EMULSION INTRAVENOUS at 11:45

## 2024-05-24 RX ADMIN — LIDOCAINE HYDROCHLORIDE 40 MG: 20 INJECTION, SOLUTION INFILTRATION; PERINEURAL at 11:45

## 2024-05-24 RX ADMIN — SODIUM CHLORIDE, POTASSIUM CHLORIDE, SODIUM LACTATE AND CALCIUM CHLORIDE: 600; 310; 30; 20 INJECTION, SOLUTION INTRAVENOUS at 10:22

## 2024-05-24 RX ADMIN — PROPOFOL 120 MG: 10 INJECTION, EMULSION INTRAVENOUS at 11:45

## 2024-05-24 ASSESSMENT — ACTIVITIES OF DAILY LIVING (ADL)
ADLS_ACUITY_SCORE: 35

## 2024-05-24 NOTE — ANESTHESIA PREPROCEDURE EVALUATION
Anesthesia Pre-Procedure Evaluation    Patient: Toño Olson   MRN: [id number] : 1965        Procedure : Procedure(s):  Colonoscopy          Past Medical History:   Diagnosis Date     Encounter for screening for cardiovascular disorders     2016     Pneumonia     Hospitalized as a child      Past Surgical History:   Procedure Laterality Date     COLONOSCOPY  2014    Hyperplastic polyps,  F/U      TONSILLECTOMY      1969     VASECTOMY      1998      Allergies   Allergen Reactions     Cats Shortness Of Breath      Social History     Tobacco Use     Smoking status: Never     Smokeless tobacco: Never   Substance Use Topics     Alcohol use: Yes     Alcohol/week: 4.2 standard drinks of alcohol     Types: 4 Standard drinks or equivalent per week     Comment: Alcoholic Drinks/day: 1-2drinks/day      Wt Readings from Last 1 Encounters:   24 104.8 kg (231 lb)        Anesthesia Evaluation   Pt has had prior anesthetic.     No history of anesthetic complications       ROS/MED HX  ENT/Pulmonary:       Neurologic: Comment: DDD      Cardiovascular:       METS/Exercise Tolerance: >4 METS    Hematologic:       Musculoskeletal:       GI/Hepatic:       Renal/Genitourinary:       Endo:     (+)          thyroid problem,            Psychiatric/Substance Use:       Infectious Disease:       Malignancy:       Other:          Physical Exam    Airway  airway exam normal      Mallampati: II   TM distance: > 3 FB   Neck ROM: full   Mouth opening: > 3 cm    Respiratory Devices and Support         Dental       (+) Minor Abnormalities - some fillings, tiny chips      Cardiovascular   cardiovascular exam normal       Rhythm and rate: regular and normal     Pulmonary   pulmonary exam normal        breath sounds clear to auscultation       OUTSIDE LABS:  CBC:   Lab Results   Component Value Date    WBC 5.7 2024    HGB 16.5 2024    HCT 49.5 2024     2024     BMP:   Lab Results   Component  "Value Date     02/14/2024     02/02/2022    POTASSIUM 4.6 02/14/2024    POTASSIUM 4.5 02/02/2022    CHLORIDE 103 02/14/2024    CHLORIDE 104 02/02/2022    CO2 29 02/14/2024    CO2 29 02/02/2022    BUN 13.3 02/14/2024    BUN 9 02/02/2022    CR 1.06 02/14/2024    CR 1.03 02/02/2022    GLC 87 02/14/2024    GLC 84 02/02/2022     COAGS: No results found for: \"PTT\", \"INR\", \"FIBR\"  POC: No results found for: \"BGM\", \"HCG\", \"HCGS\"  HEPATIC:   Lab Results   Component Value Date    ALBUMIN 4.8 02/14/2024    PROTTOTAL 7.3 02/14/2024    ALT 20 02/14/2024    AST 24 02/14/2024    ALKPHOS 59 02/14/2024    BILITOTAL 1.0 02/14/2024     OTHER:   Lab Results   Component Value Date    A1C 5.0 09/16/2019    WILBERT 9.7 02/14/2024    TSH 1.24 02/14/2024       Anesthesia Plan    ASA Status:  1    NPO Status:  NPO Appropriate    Anesthesia Type: MAC.     - Reason for MAC: straight local not clinically adequate   Induction: Intravenous.   Maintenance: Balanced.        Consents    Anesthesia Plan(s) and associated risks, benefits, and realistic alternatives discussed. Questions answered and patient/representative(s) expressed understanding.     - Discussed: Risks, Benefits and Alternatives for BOTH SEDATION and the PROCEDURE were discussed     - Discussed with:  Patient, Spouse            Postoperative Care            Comments:    Other Comments: Risks, benefits and alternatives discussed and would like to proceed. General anesthesia ok if indicated.              FALGUNI Doyle CRNA    I have reviewed the pertinent notes and labs in the chart from the past 30 days and (re)examined the patient.  Any updates or changes from those notes are reflected in this note.              # Obesity: Estimated body mass index is 30.48 kg/m  as calculated from the following:    Height as of this encounter: 1.854 m (6' 1\").    Weight as of this encounter: 104.8 kg (231 lb).      "

## 2024-05-24 NOTE — ANESTHESIA POSTPROCEDURE EVALUATION
Patient: Toño Olson    Procedure: Procedure(s):  Colonoscopy       Anesthesia Type:  MAC    Note:  Disposition: Outpatient   Postop Pain Control: Uneventful            Sign Out: Well controlled pain   PONV: No   Neuro/Psych: Uneventful            Sign Out: Acceptable/Baseline neuro status   Airway/Respiratory: Uneventful            Sign Out: Acceptable/Baseline resp. status   CV/Hemodynamics: Uneventful            Sign Out: Acceptable CV status; No obvious hypovolemia; No obvious fluid overload   Other NRE: NONE   DID A NON-ROUTINE EVENT OCCUR? No       Last vitals:  Vitals Value Taken Time   /90 05/24/24 1244   Temp     Pulse 76 05/24/24 1244   Resp     SpO2 96 % 05/24/24 1233   Vitals shown include unfiled device data.    Electronically Signed By: FALGUNI Doyle CRNA  May 24, 2024  1:01 PM

## 2024-05-24 NOTE — OP NOTE
PROCEDURE NOTE    DATE OF SERVICE: 5/24/2024    SURGEON: Dionisio Girard MD    PRE-OP DIAGNOSIS:    Healthcare maintenance   Screening      POST-OP DIAGNOSIS:  Same  Normal Exam    PROCEDURE:   Colonoscopy      ANESTHESIA:  LOPEZ Ramirez CRNA    INDICATION FOR THE PROCEDURE: The patient is a 58 year old male in need of Healthcare maintenance  . The patient has no other complaints  . After explaining the risks to include bleeding, perforation, potential inability toreach the cecum, the patient wished to proceed.    PROCEDURE:After adequate sedation, the patient was in the left lateral decubitus position.  Rectal exam was performed.  There was normal tone and no palpable masses .  The colonoscope was introduced into the rectum and advanced to the cecum with Mild difficulty.  The patient's prep was good.  The terminal cecum was reached.  The cecum, ascending, transverse, descending and sigmoid colon was unremarkable .  The scope was retroflexed in the rectum.  The rectum was unremarkable  .  The scope was straightened and removed.  The patient tolerated the procedure well.     ESTIMATED BLOOD LOSS: none    COMPLICATIONS:  None    TISSUE REMOVED:  No    RECOMMEND:      Follow-up in 10 years      Dionisio Girard MD FACS

## 2024-05-24 NOTE — H&P
"History and Physical    CHIEF COMPLAINT / REASON FOR PROCEDURE:  Healthcare maintenance     PERTINENT HISTORY   Patient is a 58 year old male who presents today for screening colonoscopy for Healthcare maintenance.   Last colonoscopy 2014.  Patient has no complaints.    Past Medical History:   Diagnosis Date    Encounter for screening for cardiovascular disorders     12/2016    Pneumonia     Hospitalized as a child     Past Surgical History:   Procedure Laterality Date    COLONOSCOPY  09/05/2014    Hyperplastic polyps,  F/U 2024    TONSILLECTOMY      1969    VASECTOMY      1998       Bleeding tendencies:  No    ALLERGIES/SENSITIVITIES:   Allergies   Allergen Reactions    Cats Shortness Of Breath        CURRENT MEDICATIONS:    Prior to Admission medications    Medication Sig Start Date End Date Taking? Authorizing Provider   levothyroxine (SYNTHROID/LEVOTHROID) 137 MCG tablet Take 1 tablet (137 mcg) by mouth daily 2/14/24  Yes Petr Banks MD   atorvastatin (LIPITOR) 10 MG tablet Take 1 tablet (10 mg) by mouth daily 2/14/24   Petr Banks MD       Physical Exam:  BP (!) 128/92   Pulse 79   Temp 98  F (36.7  C) (Tympanic)   Resp 16   Ht 1.854 m (6' 1\")   Wt 104.8 kg (231 lb)   SpO2 96%   BMI 30.48 kg/m    EXAM:  Chest/Respiratory Exam: Normal - Clear to auscultation without rales, rhonchi, or wheezing.  Cardiovascular Exam: regular rate and rhythm        PLAN: COLONOSCOPY .  Patient understands risks of bleeding, perforation, potential inability to reach cecum, aspiration and wishes to proceed.      "

## 2024-05-24 NOTE — ANESTHESIA CARE TRANSFER NOTE
Patient: Toño Olson    Procedure: Procedure(s):  Colonoscopy       Diagnosis: Colon cancer screening [Z12.11]  Diagnosis Additional Information: No value filed.    Anesthesia Type:   MAC     Note:    Oropharynx: oropharynx clear of all foreign objects and spontaneously breathing  Level of Consciousness: drowsy  Oxygen Supplementation: room air    Independent Airway: airway patency satisfactory and stable  Dentition: dentition unchanged  Vital Signs Stable: post-procedure vital signs reviewed and stable  Report to RN Given: handoff report given  Patient transferred to: Phase II    Handoff Report: Identifed the Patient, Identified the Reponsible Provider, Reviewed the pertinent medical history, Discussed the surgical course, Reviewed Intra-OP anesthesia mangement and issues during anesthesia, Set expectations for post-procedure period and Allowed opportunity for questions and acknowledgement of understanding      Vitals:  Vitals Value Taken Time   BP     Temp     Pulse     Resp     SpO2 95 % 05/24/24 1229   Vitals shown include unfiled device data.    Electronically Signed By: FALGUNI Boudreaux CRNA  May 24, 2024  12:31 PM

## 2024-05-24 NOTE — DISCHARGE INSTRUCTIONS
[facility]  Adult Discharge Orders & Instructions    ________________________________________________________________          For 12 hours after surgery  Get plenty of rest.  A responsible adult must stay with you for at least 12 hours after you leave the hospital.   You may feel lightheaded.  IF so, sit for a few minutes before standing.  Have someone help you get up.   You may have a slight fever. Call the doctor if your fever is over 101 F (38.3 C) (taken under the tongue) or lasts longer than 24 hours.  You may have a dry mouth, a sore throat, muscle aches or trouble sleeping.  These should go away after 24 hours.  Do not make important or legal decisions.  6.   Do not drive or use heavy equipment.  If you have weakness or tingling, don't drive or use heavy equipment until this feeling goes away.    To contact a doctor, call   [phone]_______________________

## 2024-05-24 NOTE — OR NURSING
Toño has been discharged to home at 1300 via wheelchair accompanied by spouse, Lakshmi.    Written discharge instructions were provided to patient and spouse.  Prescriptions were none.      Patient and adult caring for them verbalize understanding of discharge instructions including no driving until tomorrow and no longer taking narcotic pain medications - no operating mechanical equipment and no making any important decisions.They understand reason for discharge, and necessary follow-up appointments.

## 2025-01-15 ENCOUNTER — PATIENT OUTREACH (OUTPATIENT)
Dept: CARE COORDINATION | Facility: CLINIC | Age: 60
End: 2025-01-15
Payer: COMMERCIAL

## 2025-02-14 DIAGNOSIS — E78.00 HYPERCHOLESTEREMIA: ICD-10-CM

## 2025-02-14 DIAGNOSIS — E03.8 OTHER SPECIFIED HYPOTHYROIDISM: ICD-10-CM

## 2025-02-14 NOTE — TELEPHONE ENCOUNTER
Reason for call: Medication or medication refill    Have you contacted your pharmacy regarding this refill? yes    If No, direct the patient to contact the Pharmacy and discontinue telephone note using Erroneous Encounter.  If Yes, continue.    Name of medication requested: levothyroxine and atorvastatin     How many days of medication do you have left? Next  Tuesday     What pharmacy do you use? [facility]     Preferred method for responding to this message: Telephone Call    Phone number patient can be reached at: Cell number on file:    Telephone Information:   Mobile [phone]       If we cannot reach you directly, may we leave a detailed response at the number you provided? Yes

## 2025-02-17 RX ORDER — ATORVASTATIN CALCIUM 10 MG/1
10 TABLET, FILM COATED ORAL DAILY
Qty: 60 TABLET | Refills: 0 | Status: SHIPPED | OUTPATIENT
Start: 2025-02-17

## 2025-02-17 RX ORDER — LEVOTHYROXINE SODIUM 137 UG/1
137 TABLET ORAL DAILY
Qty: 60 TABLET | Refills: 0 | Status: SHIPPED | OUTPATIENT
Start: 2025-02-17

## 2025-02-17 NOTE — TELEPHONE ENCOUNTER
[id number] sent Rx request for the following:      Requested Prescriptions   Pending Prescriptions Disp Refills    levothyroxine (SYNTHROID/LEVOTHROID) 137 MCG tablet [Pharmacy Med Name: LEVOTHYROXINE 137MCG TABLET] 90 tablet 3     Sig: TAKE 1 TABLET (137 MCG) BY MOUTH DAILY       Thyroid Protocol Failed - 2/17/2025  9:29 AM        Failed - Normal TSH on file in past 12 months     Recent Labs   Lab Test 02/14/24  0845   TSH 1.24           Last Prescription Date:   2/14/24  Last Fill Qty/Refills:         90, R-3        atorvastatin (LIPITOR) 10 MG tablet [Pharmacy Med Name: ATORVASTATIN 10MG TABLET] 90 tablet 4     Sig: TAKE 1 TABLET (10 MG) BY MOUTH DAILY       Statins Protocol Failed - 2/17/2025  9:29 AM        Failed - LDL on file in past 12 months     Recent Labs   Lab Test 02/14/24  0845   *             Failed - Recent (12 mo) or future (30 days) visit within the authorizing provider's specialty     The patient must have completed an in-person or virtual visit within the past 12 months or has a future visit scheduled within the next 90 days with the authorizing provider s specialty.  Urgent care and e-visits do not qualify as an office visit for this protocol.         Antihyperlipidemic agents Failed - 2/17/2025  9:29 AM        Failed - LDL on file in the past 12 months     Last Prescription Date:   2/14/24  Last Fill Qty/Refills:         90, R-3    Last Office Visit:              2/14/24 (px)   Future Office visit:            4/4/25    Unable to complete prescription refill per RN Medication Refill Policy.     Enrike Michele RN on 2/17/2025 at 9:31 AM

## 2025-02-17 NOTE — TELEPHONE ENCOUNTER
Called and spoke to Patient after verifying last name and date of birth. Informed patient scipt was signed. Patient verbalized understanding and had no other questions or concerns.     Enrike Michele RN on 2/17/2025 at 2:11 PM

## 2025-06-14 ENCOUNTER — TRANSFERRED RECORDS (OUTPATIENT)
Dept: HEALTH INFORMATION MANAGEMENT | Facility: OTHER | Age: 60
End: 2025-06-14
Payer: COMMERCIAL

## 2025-07-11 ENCOUNTER — HOSPITAL ENCOUNTER (OUTPATIENT)
Dept: GENERAL RADIOLOGY | Facility: OTHER | Age: 60
Discharge: HOME | End: 2025-07-11
Attending: FAMILY MEDICINE
Payer: COMMERCIAL

## 2025-07-11 DIAGNOSIS — S32.040D CLOSED WEDGE COMPRESSION FRACTURE OF L4 VERTEBRA WITH ROUTINE HEALING, SUBSEQUENT ENCOUNTER: ICD-10-CM

## 2025-07-11 PROCEDURE — 72100 X-RAY EXAM L-S SPINE 2/3 VWS: CPT | Mod: 26 | Performed by: RADIOLOGY

## 2025-07-11 PROCEDURE — 72100 X-RAY EXAM L-S SPINE 2/3 VWS: CPT

## (undated) DEVICE — TUBING SUCTION 10'X3/16" N510

## (undated) DEVICE — ENDO KIT COMPLIANCE DYKENDOCMPLY

## (undated) DEVICE — SUCTION MANIFOLD NEPTUNE 2 SYS 4 PORT 0702-020-000

## (undated) DEVICE — ENDO BRUSH CHANNEL MASTER CLEANING 2-4.2MM BW-412T

## (undated) DEVICE — SOL WATER 1500ML

## (undated) RX ORDER — PROPOFOL 10 MG/ML
INJECTION, EMULSION INTRAVENOUS
Status: DISPENSED
Start: 2024-05-24